# Patient Record
Sex: FEMALE | Race: WHITE
[De-identification: names, ages, dates, MRNs, and addresses within clinical notes are randomized per-mention and may not be internally consistent; named-entity substitution may affect disease eponyms.]

---

## 2023-06-16 ENCOUNTER — HOSPITAL ENCOUNTER (OUTPATIENT)
Age: 68
Discharge: HOME | End: 2023-06-16
Payer: MEDICARE

## 2023-06-16 DIAGNOSIS — K56.609: Primary | ICD-10-CM

## 2023-06-16 DIAGNOSIS — Z85.038: ICD-10-CM

## 2023-06-16 LAB
ALANINE AMINOTRANSFER ALT/SGPT: 37 U/L (ref 13–56)
ALBUMIN SERPL-MCNC: 3.9 G/DL (ref 3.2–5)
ALKALINE PHOSPHATASE: 135 U/L (ref 45–117)
ANION GAP: 6 (ref 5–15)
AST(SGOT): 37 U/L (ref 15–37)
BUN SERPL-MCNC: 14 MG/DL (ref 7–18)
BUN/CREAT RATIO: 23.4 RATIO (ref 10–20)
CALCIUM SERPL-MCNC: 9.9 MG/DL (ref 8.5–10.1)
CARBON DIOXIDE: 28 MMOL/L (ref 21–32)
CHLORIDE: 104 MMOL/L (ref 98–107)
CRP SERPL-MCNC: 19.4 MG/L (ref 0–3)
EST GLOM FILT RATE - AFR AMER: 129 ML/MIN (ref 60–?)
GLOBULIN: 3.8 G/DL (ref 2.2–4.2)
GLUCOSE: 86 MG/DL (ref 74–106)
POTASSIUM: 3.8 MMOL/L (ref 3.5–5.1)

## 2023-06-16 PROCEDURE — 83516 IMMUNOASSAY NONANTIBODY: CPT

## 2023-06-16 PROCEDURE — 86225 DNA ANTIBODY NATIVE: CPT

## 2023-06-16 PROCEDURE — 80053 COMPREHEN METABOLIC PANEL: CPT

## 2023-06-16 PROCEDURE — 85652 RBC SED RATE AUTOMATED: CPT

## 2023-06-16 PROCEDURE — 86235 NUCLEAR ANTIGEN ANTIBODY: CPT

## 2023-06-16 PROCEDURE — 36415 COLL VENOUS BLD VENIPUNCTURE: CPT

## 2023-06-16 PROCEDURE — 82941 ASSAY OF GASTRIN: CPT

## 2023-06-16 PROCEDURE — 86334 IMMUNOFIX E-PHORESIS SERUM: CPT

## 2023-06-16 PROCEDURE — 82784 ASSAY IGA/IGD/IGG/IGM EACH: CPT

## 2023-06-16 PROCEDURE — 82378 CARCINOEMBRYONIC ANTIGEN: CPT

## 2023-06-16 PROCEDURE — 86255 FLUORESCENT ANTIBODY SCREEN: CPT

## 2023-06-16 PROCEDURE — 82785 ASSAY OF IGE: CPT

## 2023-06-16 PROCEDURE — 84165 PROTEIN E-PHORESIS SERUM: CPT

## 2023-06-16 PROCEDURE — 86140 C-REACTIVE PROTEIN: CPT

## 2023-06-16 PROCEDURE — 86256 FLUORESCENT ANTIBODY TITER: CPT

## 2023-06-19 LAB
ANTI-CHROMATIN: <0.2 AI (ref 0–0.9)
ANTI-DSDNA  AB: <1 IU/ML (ref 0–9)
ANTI-JO: <0.2 AI (ref 0–0.9)
DSDNA AB SER-ACNC: <1 IU/ML (ref 0–9)
ENA JO1 AB SER-ACNC: <0.2 AI (ref 0–0.9)
IGA SER-ACNC: 156 MG/DL (ref 87–352)
IMMUNOGLOBULIN A: 156 MG/DL (ref 87–352)
SJOGREN'S ANTI-SS-A TEST: < 0.2 AI (ref 0–0.9)
SJOGREN'S ANTI-SS-B TEST: < 0.2 AI (ref 0–0.9)

## 2023-06-22 LAB
ALBUMIN SERPL-SCNC: 3.8 G/DL (ref 2.9–4.4)
ALBUMIN: 3.8 G/DL (ref 2.9–4.4)
C-ANCA SER-ACNC: (no result) TITER
CEA SERPL-MCNC: 3.6 NG/ML (ref 0–4.7)
GAMMA GLOB SERPL ELPH-MCNC: 0.9 G/DL (ref 0.4–1.8)
GAMMA GLOBULIN: 0.9 G/DL (ref 0.4–1.8)
GASTRIN SERPL-MCNC: 17 PG/ML (ref 0–115)
IGA SERPL-MCNC: 155 MG/DL (ref 87–352)
IGG SERPL-MCNC: 791 MG/DL (ref 586–1602)
IGM SERPL-MCNC: 152 MG/DL (ref 26–217)
IMMUNOGLOBULIN A: 155 MG/DL (ref 87–352)
IMMUNOGLOBULIN G: 791 MG/DL (ref 586–1602)
IMMUNOGLOBULIN M: 152 MG/DL (ref 26–217)
P-ANCA TITR SER IF: (no result) TITER
PROEL- TOTAL PROTEIN: 6.9 G/DL (ref 6–8.5)
PROT SERPL-MCNC: 6.9 G/DL (ref 6–8.5)

## 2023-07-18 ENCOUNTER — HOSPITAL ENCOUNTER (OUTPATIENT)
Dept: HOSPITAL 100 - MRI | Age: 68
Discharge: HOME | End: 2023-07-18
Payer: MEDICARE

## 2023-07-18 VITALS
OXYGEN SATURATION: 98 % | HEART RATE: 52 BPM | DIASTOLIC BLOOD PRESSURE: 73 MMHG | RESPIRATION RATE: 16 BRPM | SYSTOLIC BLOOD PRESSURE: 133 MMHG

## 2023-07-18 VITALS
HEART RATE: 49 BPM | SYSTOLIC BLOOD PRESSURE: 144 MMHG | DIASTOLIC BLOOD PRESSURE: 61 MMHG | OXYGEN SATURATION: 98 % | RESPIRATION RATE: 16 BRPM

## 2023-07-18 DIAGNOSIS — Z85.038: ICD-10-CM

## 2023-07-18 DIAGNOSIS — K56.609: Primary | ICD-10-CM

## 2023-07-18 PROCEDURE — 74183 MRI ABD W/O CNTR FLWD CNTR: CPT

## 2023-07-18 PROCEDURE — A9575 INJ GADOTERATE MEGLUMI 0.1ML: HCPCS

## 2023-07-18 PROCEDURE — 96374 THER/PROPH/DIAG INJ IV PUSH: CPT

## 2023-07-18 RX ADMIN — GLUCAGON 1 MG: KIT at 14:00

## 2023-12-21 ENCOUNTER — HOSPITAL ENCOUNTER (OUTPATIENT)
Dept: HOSPITAL 100 - RAD | Age: 68
Discharge: HOME | End: 2023-12-21
Payer: MEDICARE

## 2023-12-21 DIAGNOSIS — K56.609: Primary | ICD-10-CM

## 2023-12-21 PROCEDURE — 74018 RADEX ABDOMEN 1 VIEW: CPT

## 2024-02-07 ENCOUNTER — HOSPITAL ENCOUNTER (OUTPATIENT)
Age: 69
Discharge: HOME | End: 2024-02-07
Payer: MEDICARE

## 2024-02-07 DIAGNOSIS — K52.9: Primary | ICD-10-CM

## 2024-02-07 PROCEDURE — 83993 ASSAY FOR CALPROTECTIN FECAL: CPT

## 2024-02-07 PROCEDURE — 82274 ASSAY TEST FOR BLOOD FECAL: CPT

## 2024-02-07 PROCEDURE — 83986 ASSAY PH BODY FLUID NOS: CPT

## 2024-02-07 PROCEDURE — 87493 C DIFF AMPLIFIED PROBE: CPT

## 2024-02-07 PROCEDURE — 87177 OVA AND PARASITES SMEARS: CPT

## 2024-02-07 PROCEDURE — 87209 SMEAR COMPLEX STAIN: CPT

## 2024-02-07 PROCEDURE — 83630 LACTOFERRIN FECAL (QUAL): CPT

## 2024-02-07 PROCEDURE — 87506 IADNA-DNA/RNA PROBE TQ 6-11: CPT

## 2024-02-07 PROCEDURE — 82653 EL-1 FECAL QUANTITATIVE: CPT

## 2024-02-07 PROCEDURE — 82705 FATS/LIPIDS FECES QUAL: CPT

## 2024-02-07 PROCEDURE — 87329 GIARDIA AG IA: CPT

## 2024-02-07 NOTE — XMS RPT_ITS
Comprehensive CCD (C-CDA v2.1)  
  
                          Created on: 2024  
  
  
AR PEACOCK  
External Reference #: [id number]  
: 1955  
Sex: Female  
  
Demographics  
  
  
                                        Address             [address]  
[place]  [address]  
   
                                        Home Phone          [phone]  
   
                                        Work Phone          [phone]  
   
                                        Preferred Language  en  
   
                                        Marital Status        
   
                                        Tenriism Affiliation Unknown  
   
                                        Race                White  
   
                                        Ethnic Group        Not  or Lati  
no  
  
  
Author  
  
  
                                        Name                Unknown  
   
                                        Address             [address]  
[address]  
[place]  [address]  
   
                                        Phone               [phone]  
   
                                        Organization        [facility]  
  
  
Care Team Providers  
  
  
                                Care Team Member Name Role            Phone  
   
                                KATY ORNELAS, DR RIOS MORAN Primary Care Physician [phone]  
   
                                Consuelo PT, Anamika Unavailable     Unavailable  
   
                                LAKESHA CAZARES, PRISCILA AGUILAR Admitting       Unavai  
tu RODRIGUEZ MD FACP, CHASITY MCCRACKEN Attending       Unavail  
klever RODRIGUEZ MD FACP, CHASITY MCCRACKEN Consulting      Unavail  
able  
   
                                DR RIOS BURNS DO Primary Care    Unavailabl  
e  
  
  
  
Allergies  
  
  
                                                    Allergy   
Classification                          Reported   
Allergen(s)               Allergy Type              Date of   
Onset                     Reaction(s)               Facility  
   
                                                      
(1 source)                              Erythromycin;   
Translations:   
[erythromycin]      Drug Allergy                            Nausea and   
vomiting                                [facility]  
Work Phone:   
[phone]  
  
  
  
Medications  
Current Medications  
  
  
  
                      Medication Drug Class(es) Dates      Sig (Normalized) Sig   
(Original)  
   
                                                    biotin 1 mg oral   
tablet  
(1 source)                                          Start:   
2014                                          biotin 1000 mcg   
oral tablet Dose :   
1,000 mcg = 1   
tab(s), Oral,   
qDay, 0 Refill(s)   
Start Date:   
14 Status:   
Ordered  
   
                                                    escitalopram 10 mg   
oral tablet  
(1 source)                              Serotonin Reuptake   
Inhibitor                               Start:   
2021                              take 1 tablet by   
mouth once daily                        escitalopram 10 mg   
oral tablet See   
Instructions, TAKE   
1 TABLET BY MOUTH   
EVERY DAY, # 90   
tab(s), 3   
Refill(s),   
Pharmacy:   
[facility]/pharmacy   
#[id number], 167.6, cm,   
21 9:20:00   
EDT, Height, kg,   
21 9:20:00   
EDT, Dosing Weight   
Start Date:   
11/3/21 Status:   
Ordered  
   
                                                    Multivitamin   
preparation  
(1 source)                                          Start:   
2014                              take 1 tablet by   
mouth once daily                        Multivitamin Dose   
= 1 tab(s), Oral,   
qDay, 0 Refill(s)   
Start Date:   
14 Status:   
Ordered  
   
                                                    naproxen 250 mg   
oral tablet  
(1 source)                              Nonsteroidal   
Anti-inflammatory   
Drug                                    Start:   
2021                                          naproxen 250 mg   
oral tablet Dose :   
250 mg = 1 tab(s),   
Oral, BID, PRN as   
needed for pain, #   
20 tab(s), 0   
Refill(s) Start   
Date: 21   
Status: Ordered  
   
                                                    SUMAtriptan 50 mg   
oral tablet  
(1 source)                              Serotonin-1b and   
Serotonin-1d   
Receptor Agonist                        Start:   
2021                              take 1 tablet by   
mouth every two   
hours, then take 4   
tablets by mouth   
every twenty-four   
hours                                   SUMAtriptan 50 mg   
oral tablet See   
Instructions, TAKE   
1 TAB AT ONSET OF   
HEADACHE MAY REPAT   
IN 2 HRS MAX 4   
TABS IN 24 HRS, #   
9 tab(s), 5   
Refill(s),   
Pharmacy:   
[facility]/pharmacy   
#[id number], 168, cm,   
21 14:18:00   
EST, Height, kg,   
21 14:18:00   
EST, Dosing Weight   
Start Date:   
12/3/21 Status:   
Ordered  
  
  
  
Completed/Discontinued Medications  
  
  
  
                      Medication Drug Class(es) Dates      Sig (Normalized) Sig   
(Original)  
   
                                                    acetaminophen 325 mg /   
oxyCODONE hydrochloride   
5 mg oral tablet  
(3 sources)               Opioid Agonist            Start:   
2021  
End:   
2021                              take 1 tablet by   
mouth three times   
daily as needed   
for pain                                acetaminophen-oxyC  
ODONE 325 mg-5 mg   
oral tablet Dose =   
1 tab(s), Oral,   
TID, PRN for pain,   
fill 22, # 84   
tab(s), 0   
Refill(s),   
Pharmacy:   
[facility]/pharmacy   
#[id number],   
Fibromyalgia,   
secondary, 168,   
cm, 21   
14:18:00 EST,   
Height, 58.1, kg,   
21 14:18:00   
EST, Dosing Weight   
Start Date:   
12/3/21 Stop Date:   
21 Status:   
Ordered  
   
                                                    Amphetamine /   
Dextroamphetamine  
(3 sources)                             Central Nervous   
System   
Stimulant                               Start:   
2021  
End:   
2021                                          amphetamine-dextro  
amphetamine 15 mg   
oral capsule,   
extended release   
Dose : 15 mg = 1   
cap(s), Oral, qAM,   
fill 22, # 28   
cap(s), 0   
Refill(s),   
Pharmacy:   
[facility]/pharmacy   
#[id number], ADHD,   
predominantly   
inattentive type,   
168, cm, 21   
14:18:00 EST,   
Height, 58.1, kg,   
21 14:18:00   
EST, Dosing Weight   
Start Date:   
12/3/21 Stop Date:   
21 Status:   
Ordered  
  
  
  
                                          
   
                                          
   
                                          
   
                                          
   
                                          
   
                                          
  
  
  
Problems  
  
  
                      Problem Classification Problem    Date       Documented Da  
te Episodic/Chronic  
   
                                                    Abdominal pain  
(2 sources)                             Abdominal pain;   
Translations:   
[Periumbilical pain]                     11-          Episodic  
   
                                                    Anxiety disorders  
(1 source)                              Generalized anxiety   
disorder                                2019          Chronic  
   
                                                    Attention-deficit,   
conduct, and disruptive   
behavior disorders  
(1 source)                              Attention deficit   
hyperactivity disorder,   
predominantly   
inattentive type                        2019          Chronic  
   
                                                    Cancer of colon  
(1 source)                              History of malignant   
neoplasm of colon                       11-          Episodic  
   
                                                    Headache; including   
migraine  
(1 source)      Migraine without aura                 2021      Chronic  
   
                                                    Intestinal obstruction   
without hernia  
(1 source)      Small bowel obstruction                 2019      Episodic  
  
  
  
                                          
   
                                          
   
                                          
  
  
  
Results  
  
  
                      Test Name  Value      Interpretation Reference Range Facil  
ity  
  
  
  
                                          
   
                                          
   
                                          
   
                                          
   
                                          
   
                                          
   
                                          
   
                                          
   
                                          
   
                                          
   
                                          
   
                                          
   
                                          
   
                                          
   
                                          
   
                                          
   
                                          
   
                                          
   
                                          
   
                                          
   
                                          
   
                                          
   
                                          
   
                                          
   
                                          
   
                                          
   
                                          
   
                                          
   
                                          
   
                                          
   
                                          
   
                                          
   
                                          
   
                                          
   
                                          
   
                                          
   
                                          
   
                                          
   
                                          
   
                                          
   
                                          
   
                                          
   
                                          
   
                                          
   
                                          
   
                                          
   
                                          
   
                                          
   
                                          
   
                                          
   
                                          
   
                                          
   
                                          
   
                                          
   
                                          
   
                                          
   
                                          
   
                                          
   
                                          
   
                                          
   
                                          
   
                                          
   
                                          
   
                                          
   
                                          
   
                                          
   
                                          
   
                                          
   
                                          
   
                                          
   
                                          
   
                                          
   
                                          
   
                                          
   
                                          
   
                                          
   
                                          
   
                                          
   
                                          
   
                                          
   
                                          
   
                                          
   
                                          
   
                                          
   
                                          
   
                                          
   
                                          
   
                                          
   
                                          
   
                                          
   
                                          
   
                                          
   
                                          
   
                                          
   
                                          
   
                                          
   
                                          
   
                                          
   
                                          
   
                                          
   
                                          
   
                                          
   
                                          
   
                                          
   
                                          
   
                                          
   
                                          
   
                                          
   
                                          
   
                                          
   
                                          
   
                                          
   
                                          
   
                                          
   
                                          
   
                                          
   
                                          
   
                                          
   
                                          
   
                                          
   
                                          
   
                                          
   
                                          
   
                                          
   
                                          
   
                                          
   
                                          
   
                                          
   
                                          
   
                                          
   
                                          
   
                                          
   
                                          
   
                                          
   
                                          
   
                                          
   
                                          
   
                                          
   
                                          
   
                                          
   
                                          
   
                                          
   
                                          
   
                                          
   
                                          
   
                                          
   
                                          
   
                                          
   
                                          
   
                                          
   
                                          
   
                                          
   
                                          
   
                                          
   
                                          
   
                                          
   
                                          
   
                                          
   
                                          
   
                                          
   
                                          
   
                                          
   
                                          
   
                                          
   
                                          
   
                                          
   
                                          
   
                                          
   
                                          
   
                                          
   
                                          
   
                                          
   
                                          
   
                                          
   
                                          
   
                                          
   
                                          
   
                                          
   
                                          
   
                                          
   
                                          
   
                                          
   
                                          
   
                                          
   
                                          
   
                                          
   
                                          
   
                                          
   
                                          
   
                                          
   
                                          
   
                                          
   
                                          
   
                                          
   
                                          
   
                                          
   
                                          
   
                                          
   
                                          
   
                                          
   
                                          
   
                                          
   
                                          
   
                                          
   
                                          
   
                                          
   
                                          
   
                                          
   
                                          
   
                                          
   
                                          
   
                                          
   
                                          
   
                                          
   
                                          
   
                                          
   
                                          
   
                                          
   
                                          
   
                                          
   
                                          
   
                                          
   
                                          
   
                                          
   
                                          
   
                                          
   
                                          
   
                                          
   
                                          
   
                                          
   
                                          
   
                                          
   
                                          
   
                                          
   
                                          
   
                                          
   
                                          
   
                                          
   
                                          
   
                                          
   
                                          
   
                                          
   
                                          
   
                                          
   
                                          
   
                                          
   
                                          
   
                                          
   
                                          
   
                                          
   
                                          
   
                                          
   
                                          
   
                                          
   
                                          
   
                                          
   
                                          
   
                                          
   
                                          
   
                                          
  
  
  
Vital Signs  
  
  
                      Date Time  Vital Sign Value      Performing Clinician Faci  
lity  
   
                                                    2022   
06:      Body temperature 96.98 [degF]    SANTA LEVINE MD  [facility]  
Work Phone:   
[phone]  
   
                                                    2022   
06:                              Diastolic blood   
pressure            87 mm[Hg]           SANTA LEVINE MD      [facility]  
Work Phone:   
[phone]  
   
                                                    2022   
06:      Heart rate      53 /min         SANTA LEVINE MD  [facility]  
Work Phone:   
[phone]  
   
                                                    2022   
06:      Mean blood pressure 120 mm[Hg]      SANTA LEVINE MD  [facility]  
Work Phone:   
[phone]  
   
                                                    2022   
06:      Respiratory rate 18 /min         SANTA LEVINE MD  [facility]  
Work Phone:   
[phone]  
   
                                                    2022   
06:                              Systolic blood   
pressure            185 mm[Hg]          SANTA LEVINE MD      [facility]  
Work Phone:   
[phone]  
  
  
  
Encounters  
  
  
                          Encounter Date Encounter Type Care Provider Facility  
   
                                                    Start: 2023  
End: 2023                         Evaluation and   
management of inpatient                 PRISCILA CROWDER   
APRN-CNP                                Facility:[facility]  
   
                                                    Start: 2022  
End: 2022                         Emergency department   
patient visit             SANTA LEVINE MD          [facility]  
Work Phone:   
[phone]  
  
  
  
Procedures  
  
  
                          Date         Procedure    Procedure Detail Performing   
Clinician  
   
                          Start: 1981 Oophorectomy              SANTA DAY MD  
  
  
  
                                          
   
                                          
   
                                          
   
                                          
   
                                          
  
  
  
Immunizations  
  
  
                      Immunization Date Immunization Notes      Care Provider Fa  
cility  
   
                                        2022          SARS-CoV-2 mRNA   
(tozinameran) vaccine                     SANTA LEVINE MD      [facility]  
Work Phone:   
[phone]  
   
                                        10-          influenza, injectabl  
e,   
quadrivalent,   
preservative free;   
Translations: [Fluarix PF   
Quadrivalent [id number]]                     SANTA LEVINE MD      [facility]  
Work Phone:   
[phone]  
   
                                        2021          SARS-CoV-2 mRNA   
(tozinameran) vaccine                     SANTA LEVINE MD      [facility] [facility]  
Work Phone:   
[phone]  
   
                                        2021          SARS-CoV-2 mRNA   
(tozinameran) vaccine                     SANTA LEVINE MD      [facility]  
Work Phone:   
[phone]  
   
                                        10-          influenza virus vacc  
ine,   
unspecified formulation                     SANTA LEVINE MD      [facility]  
Work Phone:   
[phone]  
   
                                        2017          influenza virus vacc  
ine,   
unspecified formulation                     SANTA LEVINE MD      [facility]  
Work Phone:   
[phone]  
   
                                        2017          tetanus toxoid, redu  
man   
diphtheria toxoid, and   
acellular pertussis   
vaccine, adsorbed                       SANTA LEVINE MD      [facility]  
Work Phone:   
[phone]  
   
                                        2016          influenza virus vacc  
ine,   
unspecified formulation                     SANTA LEVINE MD      [facility]  
Work Phone:   
[phone]  
   
                                        2015          influenza virus vacc  
ine,   
unspecified formulation                     SANTA LEVINE MD      [facility]  
Work Phone:   
[phone]  
   
                                        2014          influenza virus vacc  
ine,   
unspecified formulation                     SANTA LEVINE MD      [facility]  
Work Phone:   
[phone]  
  
  
  
Payers  
  
  
                          Date         Payer Category Payer        Policy ID  
   
                          2023   Medicare                  [id number]  
   
                          2023   Private Health Insurance              [facility]  
[id number]  
   
                          1955   Unknown                   [id number]  
[id number]  
  
  
  
Social History  
  
  
                          Date         Type         Detail       Facility  
   
                          Start: 2019              Ex-smoker (finding) [facility]  
Work Phone: [phone]  
  
  
  
                                          
   
                                          
  
  
  
Hospital Discharge instructions 2022  
  
  
                                Note Date & Type Note            Facility  
   
                                                    2022 Hospital Discharg  
e   
instructions                              
  
  
Patient Education  
  
  
2022 06:41:46  
  
  
Small Bowel Obstruction  
  
  
Small Bowel Obstruction  
  
Small bowel obstruction can   
lead to tissue damage and even   
tissue death.  
A small bowel obstruction   
occurs when part or all of the   
small intestine (bowel) is   
blocked. As a result,   
digestive contents can t move   
through the bowel properly and   
out of the body. Treatment is   
needed right away to remove   
the blockage. This can ease   
painful symptoms. It can also   
prevent serious problems, such   
as tissue death or bursting   
(rupture) of the small bowel.   
Without treatment, a small   
bowel obstruction can be   
fatal.  
Causes of small bowel   
obstruction  
A small bowel obstruction can   
be caused by:  
Scar tissue (adhesions). These   
may form after belly   
(abdominal) surgery or an   
infection.  
Hernia. A hernia is when an   
organ pushes through a weak   
spot or tear in the abdomen   
wall. Part of the small bowel   
can push out and be seen as a   
bulge under the belly. Hernias   
can also occur internally.  
Certain health problems. These   
include when part of the bowel   
slides inside another part   
(intussusception). Other   
causes include irritable bowel   
disease such as Crohn s   
disease, and inflammation and   
sores in the intestine   
(ulcerative colitis).  
Abnormal tissue growths   
(tumors). These can form on   
the inside or outside of the   
small bowel. They are usually   
due to cancer.  
Symptoms of small bowel   
obstruction  
Common symptoms include:  
Belly cramping and pain  
Belly swelling and bloating  
Upset stomach (nausea) and   
vomiting  
Can't pass gas  
Can't pass stool   
(constipation)  
Diarrhea  
Diagnosing small bowel   
obstruction  
Your provider will ask about   
your symptoms and health   
history. You ll also have a   
physical exam. Tests may also   
be done to confirm the   
problem. These can include:  
Imaging tests. These provide   
pictures of the small bowel.   
Common tests include X-rays   
and a CT scan.  
Blood tests. These check for   
infection and other problems,   
such as excess fluid loss   
(dehydration).  
Upper GI (gastrointestinal)   
series with a small bowel   
follow-through. This test   
takes X-rays of the upper   
digestive tract from the mouth   
through the small bowel. An   
X-ray dye (contrast fluid) is   
used. The dye coats the inside   
of your upper digestive tract   
so it will show up clearly on   
X-rays.  
Treating small bowel   
obstruction  
Treatment takes place in a   
hospital. As part of your   
care, the following may be   
done:  
No food or drink is given by   
mouth. This allows your bowels   
to rest.  
An IV (intravenous) line is   
placed in a vein in your arm   
or hand. The IV line is used   
to give fluids. It may also be   
used to give medicines. These   
may be needed to ease pain,   
nausea, and other symptoms.   
They may also be needed to   
treat or prevent infections.  
A soft, thin, flexible tube   
(nasogastric tube) is inserted   
through your nose and into   
your stomach. The tube is used   
to remove extra gas and fluid   
in your stomach and bowels.   
This helps to ease symptoms   
such as pain and swelling.  
In severe cases, surgery is   
done. This may be needed if   
the small bowel is almost or   
totally blocked, or there is a   
hole in the bowel (bowel   
perforation). During surgery,   
the blockage is removed. Parts   
of the bowel may also be   
removed if there is tissue   
death. Other repair may be   
done as well, depending on   
what caused the blockage. Your   
healthcare provider will give   
you more information about   
surgery, if needed.  
You ll be watched closely in   
the hospital until your   
symptoms improve. Your   
provider will tell you when   
you can go home.  
Long-term concerns  
After treatment, most people   
recover with no lasting   
effects. If a long part of the   
bowel is removed, there is a   
greater chance for lifelong   
digestive problems. Bowel   
movements may become   
irregular. Work with your   
provider to learn the best   
ways to manage any symptoms   
you may have, and to protect   
your health.  
When to call your healthcare   
provider  
Call your provider right away   
if you have any of the   
following:  
Severe pain (call 911)  
Belly swelling or cramping   
that won t go away  
Can t pass stool or gas  
Nausea or vomiting (especially   
if the vomit looks or smells   
like stool)  
  
[id number] [employer]. [address] [place] [address].   
All rights reserved. This   
information is not intended as   
a substitute for professional   
medical care. Always follow   
your healthcare professional's   
instructions.  
  
  
  
Follow Up Care  
  
  
2022 05:59:07  
  
  
With:RIOS BURNS DO  
Address:  
[phone]  
  
When:2-4 days                           [facility]  
Work Phone: [phone]  
  
  
  
Evaluation + Plan note  
                               LaboratoryRadiology  
  
                                Note Date & Type Note            Facility  
   
                                        Evaluation + Plan note   
  
  
Future Appointments  
  
  
Appointment Date:03/10/2022   
01:45:00 PM  
Scheduled Provider:RIOS BURNS DO  
Location:[facility]  
Appointment Type: OV Controlled   
Medication  
  
  
  
Future Scheduled TestsComplete   
Metabolic Panel 10/7/21MA Mammo   
Screening Bilateral w/ Tommie   
21  
  
                                        [facility]  
Work Phone: [phone]  
  
  
  
Hospital course Narrative  
  
  
                                Note Date & Type Note            Facility  
   
                                        Hospital course Narrative   
  
  
No data available for this   
section                                 [facility]  
Work Phone: [phone]  
  
  
  
Summary Purpose  
  
  
                                                      
  
  
  
Family History  
No Family History Records FoundNo Family History Records FoundNo Family History 
Records Found  
  
Advance Directives  
No Advanced Directives Records FoundNo Advanced Directives Records FoundNo 
Advanced Directives Records Found  
  
Additional Source Comments  
  
  
  
                                                    INFORMATION SOURCE (unrecogn  
ized section and content)  
   
                                          
  
  
  
                                          
   
                                          
  
  
  
                                DATE CREATED    AUTHOR          AUTHOR'S ORGANIZ  
ATION  
   
                                2019                      [facility]  
  
  
  
                                DATE CREATED    AUTHOR          AUTHOR'S ORGANIZ  
ATION  
   
                                06/10/2023                      [facility]  
[place] ([place]  
  
  
FOR RECORDS PERTAINING TO PATIENTS WHO ARE OR HAVE BEEN ENROLLED IN A CHEMICAL 
DEPENDENCY/SUBSTANCEABUSE PROGRAM, SOME INFORMATION MAY BE OMITTED. This 
clinical summary was aggregated from multiple sources. Caution should be 
exercised in using it in the provision of clinical care. This summary normalizes
information from multiple sources, and as a consequence, information in this 
document may materially change the coding, format and clinical context of 
patient data. In addition, data may be omitted in some cases. CLINICAL DECISIONS
SHOULD BE BASED ON THE PRIMARY CLINICAL RECORDS. [facility] [employer] [facility]. provides 
no warranty or guarantee of the accuracy or completeness of information in this 
document.

## 2024-02-09 ENCOUNTER — HOSPITAL ENCOUNTER (OUTPATIENT)
Age: 69
Discharge: HOME | End: 2024-02-09
Payer: MEDICARE

## 2024-02-09 DIAGNOSIS — K58.9: Primary | ICD-10-CM

## 2024-02-09 PROCEDURE — 82705 FATS/LIPIDS FECES QUAL: CPT

## 2024-02-09 PROCEDURE — 83993 ASSAY FOR CALPROTECTIN FECAL: CPT

## 2024-02-09 PROCEDURE — 82653 EL-1 FECAL QUANTITATIVE: CPT

## 2024-02-13 LAB — PANCREATIC ELASTASE, FECAL: 50 (ref 200–?)

## 2024-02-16 LAB — CALPROTECTIN, STOOL: 26 UG/G (ref 0–120)

## 2024-02-17 LAB — PANCREATIC ELASTASE, FECAL: < 50 (ref 200–?)

## 2024-02-21 ENCOUNTER — HOSPITAL ENCOUNTER (OUTPATIENT)
Dept: HOSPITAL 100 - EN | Age: 69
Discharge: HOME | End: 2024-02-21
Payer: MEDICARE

## 2024-02-21 VITALS
DIASTOLIC BLOOD PRESSURE: 46 MMHG | OXYGEN SATURATION: 100 % | SYSTOLIC BLOOD PRESSURE: 92 MMHG | RESPIRATION RATE: 16 BRPM | HEART RATE: 66 BPM

## 2024-02-21 VITALS
DIASTOLIC BLOOD PRESSURE: 39 MMHG | HEART RATE: 67 BPM | SYSTOLIC BLOOD PRESSURE: 127 MMHG | TEMPERATURE: 98.06 F | OXYGEN SATURATION: 100 % | RESPIRATION RATE: 16 BRPM

## 2024-02-21 VITALS — DIASTOLIC BLOOD PRESSURE: 63 MMHG | SYSTOLIC BLOOD PRESSURE: 127 MMHG

## 2024-02-21 VITALS
OXYGEN SATURATION: 98 % | SYSTOLIC BLOOD PRESSURE: 127 MMHG | HEART RATE: 60 BPM | TEMPERATURE: 97.3 F | RESPIRATION RATE: 18 BRPM | DIASTOLIC BLOOD PRESSURE: 63 MMHG

## 2024-02-21 VITALS
DIASTOLIC BLOOD PRESSURE: 43 MMHG | SYSTOLIC BLOOD PRESSURE: 95 MMHG | TEMPERATURE: 97.3 F | RESPIRATION RATE: 16 BRPM | HEART RATE: 66 BPM | OXYGEN SATURATION: 100 %

## 2024-02-21 VITALS — BODY MASS INDEX: 19 KG/M2

## 2024-02-21 DIAGNOSIS — K44.9: ICD-10-CM

## 2024-02-21 DIAGNOSIS — Z85.038: ICD-10-CM

## 2024-02-21 DIAGNOSIS — K63.5: Primary | ICD-10-CM

## 2024-02-21 DIAGNOSIS — K56.609: ICD-10-CM

## 2024-02-21 DIAGNOSIS — Z98.0: ICD-10-CM

## 2024-02-21 DIAGNOSIS — K21.9: ICD-10-CM

## 2024-02-21 DIAGNOSIS — K29.80: ICD-10-CM

## 2024-02-21 PROCEDURE — 45385 COLONOSCOPY W/LESION REMOVAL: CPT

## 2024-02-21 PROCEDURE — 0DJD8ZZ INSPECTION OF LOWER INTESTINAL TRACT, VIA NATURAL OR ARTIFICIAL OPENING ENDOSCOPIC: ICD-10-PCS | Performed by: INTERNAL MEDICINE

## 2024-02-21 PROCEDURE — 43239 EGD BIOPSY SINGLE/MULTIPLE: CPT

## 2024-02-21 PROCEDURE — 88305 TISSUE EXAM BY PATHOLOGIST: CPT

## 2024-02-21 PROCEDURE — 45380 COLONOSCOPY AND BIOPSY: CPT

## 2024-02-21 NOTE — COLBX_PTH
PATHOLOGY RESULTS

PATIENT: AR PEACOCK               ACCT #:[id number]  LOC: EN         U#:[id number]
                                       AGE/SX: 68/F         ROOM:           RE2024
REG DR: Dr. Peña Acharya DO         : 1955      BED:            DIS: 2024

--------------------------------------------------------------------------------------------

SPEC #:          RECD: 24 11:05    STATUS:  ISH MONTOYA #: [id number]
                        AIDE: 24 08:15    SUBM DR: Peña Acharya

DEPT: SURGICAL PATHOLOGY                        RECD BY: Kianna Butler

ENTERED: 24 11:07 SP TYPE: COLON BX   OTHR DR: Dr. Melchor Vargas DO

Tissues:    Duodenum, NOS
            Transverse colon
            COLON BIOPSY
Procedures: Surgery Specimen Level IV

--------------------------------------------------------------------------------------------

HEADER

OPERATION:  Colonoscopy with polypectomy, EGD with biopsy  
PRE-OP DIAGNOSIS:  Intestinal obstruction, history of malignant neoplasm of colon  
TISSUE SUBMITTED:  A - Duodenum biopsy, B - Transverse colon polyp, C - Anastomosis biopsy  

--------------------------------------------------------------------------------------------

MICROSCOPIC DIAGNOSIS

A.  Duodenum, biopsy:  
    No pathologic change.  
B.  Transverse colon polyp, biopsy:  
    Fragments of hyperplastic polyp.  
C.  Anastomosis biopsy:  
    Hyperplastic polyp.  
  
AM:naldo  2024   

--------------------------------------------------------------------------------------------

MICROSCOPIC DESCRIPTION

Slides are reviewed.  

--------------------------------------------------------------------------------------------

GROSS DESCRIPTION

A - Received in fixative is one container labeled with the patient's name and designated 
 duodenum biopsy.   The specimen consists of multiple irregular fragments of light tan soft 
tissue that in aggregate measure 1.0 x 0.3 x 0.1 cm.  The specimen is totally submitted in 
one cassette.   
B - Received in fixative is one container labeled with the patient's name and designated 
 transverse colon polyp.   The specimen consists of multiple irregular fragments of light 
tan soft tissue that in aggregate measure 0.6 x 0.3 x 0.1 cm.  The specimen is totally 
submitted in one cassette.   
C - Received in fixative is one container labeled with the patient's name and designated 
 anastomosis biopsy.   The specimen consists of one irregular fragment of light tan soft 
tissue that measures 0.3 x 0.3 x 0.1 cm.  The specimen is totally submitted in one cassette. 
 / NERY:naldo  2024  TC:5  
CPT: 88305 x3

## 2024-02-21 NOTE — OP.CCLET_ITS
2/21/2024 
Melchor Vargas 
Re : Colonoscopy procedure for Arpita Mcghee 
Marlynr  Sam 
This procedure was performed on Wednesday, February 21, 2024.  My impressions 
 and recommendations are as follows: 
Impressions :  
- Patent end-to-side colo-colonic anastomosis, characterized by healthy 
 appearing mucosa.  Biopsied.  
- One 8 mm polyp in the transverse colon, removed with a hot snare.  Resected 
 and retrieved.  
- The examination was otherwise normal. 
Recommendations :  
- Discharge patient to home.  
- Resume previous diet.  
- Continue present medications.  
- Await pathology results.  
- Repeat colonoscopy for surveillance. 
My findings are described in the full procedure note, which is enclosed.  If I 
 can be of further assistance, please feel free to contact me at . 
Sincerely, 
Peña Acharya,  
2/21/2024 8:49:59 AM 
This report has been signed electronically.

## 2024-02-21 NOTE — OP.CCLET_ITS
2/21/2024 
Melchor Vargas 
Re : Upper GI endoscopy procedure for Arpita Mcghee 
Marlynr  Sam 
This procedure was performed on Wednesday, February 21, 2024.  My impressions 
 and recommendations are as follows: 
Impressions :  
- Normal esophagus.  
- Small hiatal hernia.  
- Erythematous mucosa in the gastric body.  
- Bile duodenitis.  Biopsied. 
Recommendations :  
- Discharge patient to home.  
- Resume previous diet.  
- Continue present medications.  
- Await pathology results. 
My findings are described in the full procedure note, which is enclosed.  If I 
 can be of further assistance, please feel free to contact me at . 
Sincerely, 
Peña Acharya,  
2/21/2024 8:46:28 AM 
This report has been signed electronically.

## 2024-02-21 NOTE — XMS RPT_ITS
Comprehensive CCD (C-CDA v2.1)  
  
                          Created on: 2024  
  
  
AR PEACOCK JEFF  
External Reference #: [id number]  
: 1955  
Sex: Female  
  
Demographics  
  
  
                                        Address             [address]  
[place]  [address]  
   
                                        Home Phone          [phone]  
   
                                        Preferred Language  en  
   
                                        Marital Status        
   
                                        M[employer] Affiliation Unknown  
   
                                        Race                White  
   
                                        Ethnic Group        Not  or Lati  
no  
  
  
Author  
  
  
                                        Name                Unknown  
   
                                        Address             [address]  
[address]  
[place]  [address]  
   
                                        Phone               [phone]  
   
                                        Organization        [facility]  
  
  
Care Team Providers  
  
  
                                Care Team Member Name Role            Phone  
   
                                DR RIOS BURNS DO Primary Care Physician [phone]  
   
                                nAamika Cordero PT Unavailable     Unavailable  
   
                                DR RIOS BURNS DO Primary Care    Unavailtalia TATE DO, GERONIMO Consulting      Unavailable  
   
                                YAKOV ORNELAS, EDUARDO Attending       Unavailable  
  
  
  
Allergies  
  
  
                                                    Allergy   
Classification                          Reported   
Allergen(s)               Allergy Type              Date of   
Onset                     Reaction(s)               Facility  
   
                                                      
(1 source)                              Erythromycin;   
Translations:   
[erythromycin]      Drug Allergy                            Nausea and   
vomiting                                [facility]  
Work Phone:   
[phone]  
  
  
  
Medications  
Current Medications  
  
  
  
                      Medication Drug Class(es) Dates      Sig (Normalized) Sig   
(Original)  
   
                                                    biotin 1 mg oral   
tablet  
(1 source)                                          Start:   
2014                                          biotin 1000 mcg   
oral tablet Dose :   
1,000 mcg = 1   
tab(s), Oral,   
qDay, 0 Refill(s)   
Start Date:   
14 Status:   
Ordered  
   
                                                    escitalopram 10 mg   
oral tablet  
(1 source)                              Serotonin Reuptake   
Inhibitor                               Start:   
2021                              take 1 tablet by   
mouth once daily                        escitalopram 10 mg   
oral tablet See   
Instructions, TAKE   
1 TABLET BY MOUTH   
EVERY DAY, # 90   
tab(s), 3   
Refill(s),   
Pharmacy:   
[facility]/pharmacy   
#[id number], 167.6, cm,   
21 9:20:00   
EDT, Height, kg,   
21 9:20:00   
EDT, Dosing Weight   
Start Date:   
11/3/21 Status:   
Ordered  
   
                                                    Multivitamin   
preparation  
(1 source)                                          Start:   
2014                              take 1 tablet by   
mouth once daily                        Multivitamin Dose   
= 1 tab(s), Oral,   
qDay, 0 Refill(s)   
Start Date:   
5/29/14 Status:   
Ordered  
   
                                                    naproxen 250 mg   
oral tablet  
(1 source)                              Nonsteroidal   
Anti-inflammatory   
Drug                                    Start:   
2021                                          naproxen 250 mg   
oral tablet Dose :   
250 mg = 1 tab(s),   
Oral, BID, PRN as   
needed for pain, #   
20 tab(s), 0   
Refill(s) Start   
Date: 21   
Status: Ordered  
   
                                                    SUMAtriptan 50 mg   
oral tablet  
(1 source)                              Serotonin-1b and   
Serotonin-1d   
Receptor Agonist                        Start:   
2021                              take 1 tablet by   
mouth every two   
hours, then take 4   
tablets by mouth   
every twenty-four   
hours                                   SUMAtriptan 50 mg   
oral tablet See   
Instructions, TAKE   
1 TAB AT ONSET OF   
HEADACHE MAY REPAT   
IN 2 HRS MAX 4   
TABS IN 24 HRS, #   
9 tab(s), 5   
Refill(s),   
Pharmacy:   
[facility]/pharmacy   
#[id number], 168, cm,   
21 14:18:00   
EST, Height, kg,   
21 14:18:00   
EST, Dosing Weight   
Start Date:   
12/3/21 Status:   
Ordered  
  
  
  
Completed/Discontinued Medications  
  
  
  
                      Medication Drug Class(es) Dates      Sig (Normalized) Sig   
(Original)  
   
                                                    acetaminophen 325 mg /   
oxyCODONE hydrochloride   
5 mg oral tablet  
(3 sources)               Opioid Agonist            Start:   
2021  
End:   
2021                              take 1 tablet by   
mouth three times   
daily as needed   
for pain                                acetaminophen-oxyC  
ODONE 325 mg-5 mg   
oral tablet Dose =   
1 tab(s), Oral,   
TID, PRN for pain,   
fill 22, # 84   
tab(s), 0   
Refill(s),   
Pharmacy:   
[facility]/pharmacy   
#[id number],   
Fibromyalgia,   
secondary, 168,   
cm, 21   
14:18:00 EST,   
Height, 58.1, kg,   
21 14:18:00   
EST, Dosing Weight   
Start Date:   
12/3/21 Stop Date:   
21 Status:   
Ordered  
   
                                                    Amphetamine /   
Dextroamphetamine  
(3 sources)                             Central Nervous   
System   
Stimulant                               Start:   
2021  
End:   
2021                                          amphetamine-dextro  
amphetamine 15 mg   
oral capsule,   
extended release   
Dose : 15 mg = 1   
cap(s), Oral, qAM,   
fill 22, # 28   
cap(s), 0   
Refill(s),   
Pharmacy:   
[facility]/pharmacy   
#[id number], ADHD,   
predominantly   
inattentive type,   
168, cm, 21   
14:18:00 EST,   
Height, 58.1, kg,   
21 14:18:00   
EST, Dosing Weight   
Start Date:   
12/3/21 Stop Date:   
21 Status:   
Ordered  
  
  
  
                                          
   
                                          
   
                                          
   
                                          
   
                                          
   
                                          
  
  
  
Problems  
  
  
                      Problem Classification Problem    Date       Documented Da  
te Episodic/Chronic  
   
                                                    Abdominal pain  
(2 sources)                             Abdominal pain;   
Translations:   
[Periumbilical pain]                     11-          Episodic  
   
                                                    Anxiety disorders  
(1 source)                              Generalized anxiety   
disorder                                2019          Chronic  
   
                                                    Attention-deficit,   
conduct, and disruptive   
behavior disorders  
(1 source)                              Attention deficit   
hyperactivity disorder,   
predominantly   
inattentive type                        2019          Chronic  
   
                                                    Cancer of colon  
(1 source)                              History of malignant   
neoplasm of colon                       11-          Episodic  
   
                                                    Headache; including   
migraine  
(1 source)      Migraine without aura                 2021      Chronic  
   
                                                    Intestinal obstruction   
without hernia  
(1 source)      Small bowel obstruction                 2019      Episodic  
  
  
  
                                          
   
                                          
   
                                          
  
  
  
Results  
  
  
                      Test Name  Value      Interpretation Reference Range Facil  
ity  
  
  
  
                                          
   
                                          
   
                                          
   
                                          
   
                                          
   
                                          
   
                                          
   
                                          
   
                                          
   
                                          
   
                                          
   
                                          
   
                                          
   
                                          
   
                                          
   
                                          
   
                                          
   
                                          
   
                                          
   
                                          
   
                                          
   
                                          
   
                                          
   
                                          
   
                                          
   
                                          
   
                                          
   
                                          
   
                                          
   
                                          
   
                                          
   
                                          
   
                                          
   
                                          
   
                                          
   
                                          
   
                                          
   
                                          
   
                                          
   
                                          
   
                                          
   
                                          
   
                                          
   
                                          
   
                                          
   
                                          
   
                                          
   
                                          
   
                                          
   
                                          
   
                                          
   
                                          
   
                                          
   
                                          
   
                                          
   
                                          
   
                                          
   
                                          
   
                                          
   
                                          
   
                                          
   
                                          
   
                                          
   
                                          
   
                                          
   
                                          
   
                                          
   
                                          
   
                                          
   
                                          
   
                                          
   
                                          
   
                                          
   
                                          
   
                                          
   
                                          
   
                                          
   
                                          
   
                                          
   
                                          
   
                                          
   
                                          
   
                                          
   
                                          
   
                                          
   
                                          
   
                                          
   
                                          
   
                                          
   
                                          
   
                                          
   
                                          
   
                                          
   
                                          
   
                                          
   
                                          
   
                                          
   
                                          
   
                                          
   
                                          
   
                                          
   
                                          
   
                                          
   
                                          
   
                                          
   
                                          
   
                                          
   
                                          
   
                                          
   
                                          
   
                                          
   
                                          
   
                                          
   
                                          
   
                                          
   
                                          
   
                                          
   
                                          
   
                                          
   
                                          
   
                                          
   
                                          
   
                                          
   
                                          
   
                                          
   
                                          
   
                                          
   
                                          
   
                                          
   
                                          
   
                                          
   
                                          
   
                                          
   
                                          
   
                                          
   
                                          
   
                                          
   
                                          
   
                                          
   
                                          
   
                                          
   
                                          
   
                                          
   
                                          
   
                                          
   
                                          
  
  
  
Vital Signs  
  
  
                      Date Time  Vital Sign Value      Performing Clinician Faci  
lity  
   
                                                    2022   
06:      Body temperature 96.98 [degF]    SANTA LEVINE MD  [facility]  
Work Phone:   
[phone]  
   
                                                    2022   
06:                              Diastolic blood   
pressure            87 mm[Hg]           SANTA LEVINE MD      [facility]  
Work Phone:   
[phone]  
   
                                                    2022   
06:      Heart rate      53 /min         SANTA LEVINE MD  [facility]  
Work Phone:   
[phone]  
   
                                                    2022   
06:      Mean blood pressure 120 mm[Hg]      SANTA LEVINE MD  [facility]  
Work Phone:   
[phone]  
   
                                                    2022   
06:      Respiratory rate 18 /min         SANTA LEVINE MD  [facility]  
Work Phone:   
[phone]  
   
                                                    2022   
06:                              Systolic blood   
pressure            185 mm[Hg]          SANTA LEVINE MD      [facility]  
Work Phone:   
[phone]  
  
  
  
Encounters  
  
  
                          Encounter Date Encounter Type Care Provider Facility  
   
                                                    Start: 02-  
End: 02-                         Emergency department   
patient visit             DR RIOS BURNS DO      Facility:[facility]  
   
                                                    Start: 2022  
End: 2022                         Emergency department   
patient visit             SANTA LEVINE MD          [facility]  
Work Phone:   
[phone]  
  
  
  
Procedures  
  
  
                          Date         Procedure    Procedure Detail Performing   
Clinician  
   
                          Start: 1981 Oophorectomy              SANTA DAY MD  
  
  
  
                                          
   
                                          
   
                                          
   
                                          
   
                                          
  
  
  
Immunizations  
  
  
                      Immunization Date Immunization Notes      Care Provider Fa  
cility  
   
                                        2022          SARS-CoV-2 mRNA   
(tozinameran) vaccine                     SANTA LEVINE MD      [facility]  
Work Phone:   
[phone]  
   
                                        10-          influenza, injectabl  
e,   
quadrivalent,   
preservative free;   
Translations: [Fluarix PF   
Quadrivalent [id number]]                     SANTA LEVINE MD      [facility]  
Work Phone:   
[phone]  
   
                                        2021          SARS-CoV-2 mRNA   
(tozinameran) vaccine                     SANTA LEVINE MD      [facility]  
Work Phone:   
[phone]  
   
                                        2021          SARS-CoV-2 mRNA   
(tozinameran) vaccine                     SANTA LEVINE MD      [facility]  
Work Phone:   
[phone]  
   
                                        10-          influenza virus vacc  
ine,   
unspecified formulation                     SANTA LEVINE MD      [facility]  
Work Phone:   
[phone]  
   
                                        2017          influenza virus vacc  
ine,   
unspecified formulation                     SANTA LEVINE MD      [facility]  
Work Phone:   
[phone]  
   
                                        2017          tetanus toxoid, redu  
man   
diphtheria toxoid, and   
acellular pertussis   
vaccine, adsorbed                       SANTA LEVINE MD      [facility]  
Work Phone:   
[phone]  
   
                                        2016          influenza virus vacc  
ine,   
unspecified formulation                     SANTA LEVINE MD      [facility]  
Work Phone:   
[phone]  
   
                                        2015          influenza virus vacc  
ine,   
unspecified formulation                     SANTA LEVINE MD      [facility]  
Work Phone:   
[phone]  
   
                                        2014          influenza virus vacc  
ine,   
unspecified formulation                     SANTA LEVINE MD      [facility]  
Work Phone:   
[phone]  
  
  
  
Payers  
  
  
                          Date         Payer Category Payer        Policy ID  
   
                          02-   Medicare                  [id number]  
   
                          02-   Private Health Insurance              [employer]  
[id number]  
   
                          1955   Unknown                   [id number]  
[id number]  
  
  
  
Social History  
  
  
                          Date         Type         Detail       Facility  
   
                          Start: 2019              Ex-smoker (finding) [facility]  
Work Phone: [phone]  
  
  
  
                                          
   
                                          
  
  
  
Clinical Note 2024  
  
  
                                Note Date & Type Note            Facility  
   
                                        2024 Note     .  
MICRO - Microbiology  
PROCEDURE: Urine Culture [*1]   
ACCESSION: [id number]  
SOURCE: Urine, Clean Catch BODY SITE:  
COLLECTED DATE/TIME: 2/10/2024 10:52   
EST RECEIVED DATE/TIME: 2/10/2024   
16:38 EST  
START DATE/TIME: 2/10/2024 16:38 EST   
FREE TEXT SOURCE:  
***FINAL REPORTS***  
Final Report []  
Verified Date/Time/Personnel:   
2024 14:02 EST  
>100,000 cfu/ml Mixed growth   
consistent with normal urogenital   
mynor.  
Performing Locations  
*1: This test was performed at:  
[facility], [address] [place] [address], [address] [place] [address] ([place])  
  
  
  
Hospital Discharge instructions 2022  
  
  
                                Note Date & Type Note            Facility  
   
                                                    2022 Hospital Discharg  
e   
instructions                              
  
  
Patient Education  
  
  
2022 06:41:46  
  
  
Small Bowel Obstruction  
  
  
Small Bowel Obstruction  
  
Small bowel obstruction can   
lead to tissue damage and even   
tissue death.  
A small bowel obstruction   
occurs when part or all of the   
small intestine (bowel) is   
blocked. As a result,   
digestive contents can t move   
through the bowel properly and   
out of the body. Treatment is   
needed right away to remove   
the blockage. This can ease   
painful symptoms. It can also   
prevent serious problems, such   
as tissue death or bursting   
(rupture) of the small bowel.   
Without treatment, a small   
bowel obstruction can be   
fatal.  
Causes of small bowel   
obstruction  
A small bowel obstruction can   
be caused by:  
Scar tissue (adhesions). These   
may form after belly   
(abdominal) surgery or an   
infection.  
Hernia. A hernia is when an   
organ pushes through a weak   
spot or tear in the abdomen   
wall. Part of the small bowel   
can push out and be seen as a   
bulge under the belly. Hernias   
can also occur internally.  
Certain health problems. These   
include when part of the bowel   
slides inside another part   
(intussusception). Other   
causes include irritable bowel   
disease such as Crohn s   
disease, and inflammation and   
sores in the intestine   
(ulcerative colitis).  
Abnormal tissue growths   
(tumors). These can form on   
the inside or outside of the   
small bowel. They are usually   
due to cancer.  
Symptoms of small bowel   
obstruction  
Common symptoms include:  
Belly cramping and pain  
Belly swelling and bloating  
Upset stomach (nausea) and   
vomiting  
Can't pass gas  
Can't pass stool   
(constipation)  
Diarrhea  
Diagnosing small bowel   
obstruction  
Your provider will ask about   
your symptoms and health   
history. You ll also have a   
physical exam. Tests may also   
be done to confirm the   
problem. These can include:  
Imaging tests. These provide   
pictures of the small bowel.   
Common tests include X-rays   
and a CT scan.  
Blood tests. These check for   
infection and other problems,   
such as excess fluid loss   
(dehydration).  
Upper GI (gastrointestinal)   
series with a small bowel   
follow-through. This test   
takes X-rays of the upper   
digestive tract from the mouth   
through the small bowel. An   
X-ray dye (contrast fluid) is   
used. The dye coats the inside   
of your upper digestive tract   
so it will show up clearly on   
X-rays.  
Treating small bowel   
obstruction  
Treatment takes place in a   
hospital. As part of your   
care, the following may be   
done:  
No food or drink is given by   
mouth. This allows your bowels   
to rest.  
An IV (intravenous) line is   
placed in a vein in your arm   
or hand. The IV line is used   
to give fluids. It may also be   
used to give medicines. These   
may be needed to ease pain,   
nausea, and other symptoms.   
They may also be needed to   
treat or prevent infections.  
A soft, thin, flexible tube   
(nasogastric tube) is inserted   
through your nose and into   
your stomach. The tube is used   
to remove extra gas and fluid   
in your stomach and bowels.   
This helps to ease symptoms   
such as pain and swelling.  
In severe cases, surgery is   
done. This may be needed if   
the small bowel is almost or   
totally blocked, or there is a   
hole in the bowel (bowel   
perforation). During surgery,   
the blockage is removed. Parts   
of the bowel may also be   
removed if there is tissue   
death. Other repair may be   
done as well, depending on   
what caused the blockage. Your   
healthcare provider will give   
you more information about   
surgery, if needed.  
You ll be watched closely in   
the hospital until your   
symptoms improve. Your   
provider will tell you when   
you can go home.  
Long-term concerns  
After treatment, most people   
recover with no lasting   
effects. If a long part of the   
bowel is removed, there is a   
greater chance for lifelong   
digestive problems. Bowel   
movements may become   
irregular. Work with your   
provider to learn the best   
ways to manage any symptoms   
you may have, and to protect   
your health.  
When to call your healthcare   
provider  
Call your provider right away   
if you have any of the   
following:  
Severe pain (call 911)  
Belly swelling or cramping   
that won t go away  
Can t pass stool or gas  
Nausea or vomiting (especially   
if the vomit looks or smells   
like stool)  
  
[id number] [employer]. [address] [place] [address].   
All rights reserved. This   
information is not intended as   
a substitute for professional   
medical care. Always follow   
your healthcare professional's   
instructions.  
  
  
  
Follow Up Care  
  
  
2022 05:59:07  
  
  
With:RIOS BURNS DO  
Address:  
[phone]  
  
When:2-4 days                           [facility]  
Work Phone: [phone]  
  
  
  
Evaluation + Plan note  
                               LaboratoryRadiology  
  
                                Note Date & Type Note            Facility  
   
                                        Evaluation + Plan note   
  
  
Future Appointments  
  
  
Appointment Date:03/10/2022   
01:45:00 PM  
Scheduled Provider:RIOS BURNS DO  
Location:[facility]  
Appointment Type:PC OV Controlled   
Medication  
  
  
  
Future Scheduled TestsComplete   
Metabolic Panel 10/7/21MA Mammo   
Screening Bilateral w/ Tommie   
21  
  
                                        [facility]  
Work Phone: [phone]  
  
  
  
Hospital course Narrative  
  
  
                                Note Date & Type Note            Facility  
   
                                        Hospital course Narrative   
  
  
No data available for this   
section                                 [facility]  
Work Phone: [phone]  
  
  
  
Summary Purpose  
  
  
                                                      
  
  
  
Family History  
No Family History Records FoundNo Family History Records FoundNo Family History 
Records Found  
  
Advance Directives  
No Advanced Directives Records FoundNo Advanced Directives Records FoundNo 
Advanced Directives Records Found  
  
Additional Source Comments  
  
  
  
                                                    INFORMATION SOURCE (unrecogn  
ized section and content)  
   
                                          
  
  
  
                                          
   
                                          
  
  
  
                                DATE CREATED    AUTHOR          AUTHOR'S ORGANLUCILA  
ATION  
   
                                2019                      [facility]  
  
  
  
                                DATE CREATED    AUTHOR          AUTHOR'S ORGANLUCILA  
ATION  
   
                                02/15/2024                      [facility]  
[place] ([place])  
  
  
FOR RECORDS PERTAINING TO PATIENTS WHO ARE OR HAVE BEEN ENROLLED IN A CHEMICAL 
DEPENDENCY/SUBSTANCEABUSE PROGRAM, SOME INFORMATION MAY BE OMITTED. This 
clinical summary was aggregated from multiple sources. Caution should be 
exercised in using it in the provision of clinical care. This summary normalizes
 information from multiple sources, and as a consequence, information in this 
document may materially change the coding, format and clinical context of 
patient data. In addition, data may be omitted in some cases. CLINICAL DECISIONS
 SHOULD BE BASED ON THE PRIMARY CLINICAL RECORDS. [facility]. provides
 no warranty or guarantee of the accuracy or completeness of information in this
 document.

## 2024-02-21 NOTE — OP.EGD_ITS
Patient Name: Arpita Mcghee 
Procedure Date: 2/21/2024 8:04 AM 
MRN: [id number] 
YOB: 1955 
Account Number: [id number] 
Age: 68 
Procedure:                    Upper GI endoscopy 
Indications:                  Epigastric abdominal pain, Functional Dyspepsia 
Providers:                    Peña Acharya DO 
Referring MD:                 Melchor Vargas 
Medicines:                    Monitored Anesthesia Care 
Patient Profile:              This is a 68 year old female. Refer to note in  
                              patient chart for documentation of history and  
                              physical. Patient has symptoms of chronic  
                              abdominal cramping and chronic epigastric  
                              abdominal pain. 
Complications:                No immediate complications. 
Procedure:                    Pre-Anesthesia Assessment: 
                              - Prior to the procedure, a History and  
                              Physical was performed, and patient medications  
                              and allergies were reviewed. The patient is  
                              competent. The risks and benefits of the  
                              procedure and the sedation options and risks  
                              were discussed with the patient. All questions  
                              were answered and informed consent was  
                              obtained. Patient identification and proposed  
                              procedure were verified by the physician in the  
                              pre-procedure area. Mental Status Examination:  
                              alert and oriented. Airway Examination: normal  
                              oropharyngeal airway and neck mobility.  
                              Respiratory Examination: clear to auscultation.  
                              CV Examination: normal. Prophylactic  
                              Antibiotics: The patient does not require  
                              prophylactic antibiotics. Prior Anticoagulants:  
                              The patient has taken no anticoagulant or  
                              antiplatelet agents. ASA Grade Assessment: II -  
                              A patient with mild systemic disease. After  
                              reviewing the risks and benefits, the patient  
                              was deemed in satisfactory condition to undergo  
                              the procedure. The anesthesia plan was to use  
                              monitored anesthesia care (MAC). Immediately  
                              prior to administration of medications, the  
                              patient was re-assessed for adequacy to receive  
                              sedatives. The heart rate, respiratory rate,  
                              oxygen saturations, blood pressure, adequacy of  
                              pulmonary ventilation, and response to care  
                              were monitored throughout the procedure. The  
                              physical status of the patient was re-assessed  
                              after the procedure. 
                              After obtaining informed consent, the endoscope  
                              was passed under direct vision. Throughout the  
                              procedure, the patient's blood pressure, pulse,  
                              and oxygen saturations were monitored  
                              continuously. The Colonoscope was introduced  
                              through the mouth, and advanced to the second  
                              part of duodenum. The upper GI endoscopy was  
                              accomplished without difficulty. The patient  
                              tolerated the procedure well. 
Scope In: 8:18:45 AM 
Scope Out: 8:22:41 AM 
Total Procedure Duration Time 0 hours 3 minutes 56 seconds  
Findings: 
     The examined esophagus was normal. 
     A small hiatal hernia was present. 
     Diffuse mildly erythematous mucosa without bleeding was found in the  
     gastric body. 
     Patchy inflammation characterized by erosions, erythema and friability  
     was found in the duodenal bulb. Biopsies were taken with a cold forceps  
     for histology. Verification of patient identification for the specimen  
     was done. Estimated blood loss was minimal. 
Impression:                   - Normal esophagus. 
                              - Small hiatal hernia. 
                              - Erythematous mucosa in the gastric body. 
                              - Bile duodenitis. Biopsied. 
Recommendation:               - Discharge patient to home. 
                              - Resume previous diet. 
                              - Continue present medications. 
                              - Await pathology results. 
Procedure Code(s):            --- Professional --- 
                              57820, Esophagogastroduodenoscopy, flexible,  
                              transoral; with biopsy, single or multiple 
CPT copyright 2022 [employer]. All rights reserved. 
The codes documented in this report are preliminary and upon  review may  
be revised to meet current compliance requirements. 
Peña Acharya DO 
2/21/2024 8:46:28 AM 
This report has been signed electronically. 
Number of Addenda: 0 
Note Initiated On: 2/21/2024 8:04 AM

## 2024-02-21 NOTE — HP.PCM_ITS
History and Physical    
Date of Admission: 02/21/24    
AR PEACOCK, is a 67 F who presents to the office today for     
    
PMH ADHD; anxiety; depression; fibromyalgia.    
    
[facility] established until he retired.     
    
    
    
[facility] hospitalization 1.20.23-1.22.23 with small bowel obstruction   
versus ileus s/p bowel rest and IVF.    
CT abd/pel 1.20.23 small hiatal hernia with wall thickening which is new; small   
amount of pelvic fluid; rectal anastomosis; mildly dilated small bowel   
throughout pelvis and into distal portion of ileum; gastric distention with wall  
thickening.    
    
PCP OV 3.13.23 as routine OV for chronic health issues. GI issues include   
recurrent SBO requiring hospitalization (she estimates approximately 10 SBO);   
notes history of colon cancer s/p chemotherapy and radiation, Stage 2-3    
    
?????????????? Biochemical 1.2023 CBC, CMP without pertinent abnormality.    
    
*BGI established 6.16.23 colorectal cancer near rectum stage 2-3 with minimal   
invasion into soft tissue s/p surgical resection near rectum 2012ish with   
Ileostomy placement with reversal six weeks later. She had chemotherapy and   
radiation. Reports 14 SBO since surgery; in various areas throughout her small   
bowel, but may also be occurring near ileostomy site.    
    
?????????????? Biochemical ESR, CMP, CATHERINE comp, ANCA, CEA, celiac, gastrin, GAME,  
VINI, IBD without pertinent abnormality.    
    
?????????????? AST 37-ALT 37-AP H135, CRP H19.4    
    
?????????????? MREnterography 7.19.23 WNL    
    
Contact, VM 8.1.23 with enterography results.    
    
OV 10.30.23 doing well without SBO since LV. BM vary between loose stools and   
hard stool. Since establishing with this clinic she had decreased caliber and   
softer stools and occurs typically postprandially; because of this she will   
avoid eating if there are certain activities during her day. Tries to eat softer  
foods with smaller quantities.    
    
ROS    
Const    
Constitutional: No anorexia, fatigue, fever(s), weight change or sleep problems    
Eyes    
Eyes: No change in vision    
ENT    
ENT: No abnormal hearing, difficulty swallowing, mouth lesions, tongue swelling   
or throat swelling    
Resp    
Respiratory: No cough or shortness of breath    
Cardio    
Cardiology: No chest pain at rest, chest pain with exertion, shortness of breath  
or dyspnea on exertion    
Gastro    
GI: No difficulty swallowing    
    
Genitourinary-Female: No difficulty urinating or burning urination    
Musc    
Musculoskeletal: No joint pain, joint swelling, muscle weakness or decreased   
muscle mass    
Skin    
Skin: No hair loss in leg, yellowing of the eye, itchy eyes, rash, skin ulcer or  
skin swelling    
Neuro    
Neurology: No abnormal hearing, abnormal movements, confusion, unsteady   
gait/balance or memory loss    
Psych    
Psychiatric: No anxiety, No confusion and No memory loss    
Endo    
Endocrine: No fatigue or weight change    
Aller/Imm    
Allergy/Immunologic: No itchy eyes, throat swelling or tongue swelling    
Hema/Lymp    
Hematologic/Lymphatic: No easy bleeding, easy bruising or enlarged lymph nodes    
    
Exam    
Const    
General: cooperative and comfortable    
Nutritional Appearance: average body habitus and well nourished    
HENMT    
Head: normal to inspection    
Ears: hearing grossly normal bilaterally    
Nose: external nose normal    
Face and sinus: normal facial exam    
Mouth: oral mucosae normal    
Throat: posterior oropharynx normal    
Eyes    
General: appearance normal, both eyes and all related structures    
Neck    
Neck: normal visual inspection    
Chest    
Chest palpation & inspection: normal inspection of the chest and normal   
palpation of entire chest wall    
Resp    
Effort & Inspection: normal respiratory effort    
Auscultation: Bilateral: Clear to Auscultation    
Cardio    
Palpation: normal PMI    
Rate: regular rate    
Rhythm: regular rhythm    
GI    
Inspection: normal to inspection    
Auscultation: normal bowel sounds    
Percussion: normal to percussion    
Palpation: no hepatosplenomegaly    
Skin    
General: no rashes or lesions noted    
Neuro    
General: patient alert    
Extrem    
General: normal to inspection    
Psych    
Affect: normal affect    
    
Quality Reporting    
Tobacco Screening ()    
Smoking Status: Former smoker    
    
Assessment and Plan    
Assessment and Plan    
(1) Intestinal obstruction:     
      Status: Chronic    
      Qualifiers:    
        Intestinal obstruction type: unspecified  Intestinal obstruction extent:  
unspecified extent  Qualified Code(s): K56.609 - Unspecified intestinal   
obstruction, unspecified as to partial versus complete obstruction    
      Plan:     
Differential diagnosis for her multiple small bowel obstructions are radiation-  
induced enteritis, adhesions, intermittent volvulus or intussusception of the   
bowel.  She will undergo an MRI enterography and also biochemical testing for   
any associated vasculitis or inflammatory bowel disease. She will undergo   
patency capsule study.    
(2) History of malignant neoplasm of colon:     
      Status: Chronic    
      Plan:     
She had a surveillance colonoscopy because of her history of rectal cancer about  
3 years ago.  She will need a repeat colonoscopy in the future for surveillance   
purposes.

## 2024-02-21 NOTE — PCM.HP.BLA
History and Physical
Date of Admission: 02/21/24
AR PEACOCK, is a 67 F who presents to the office today for 

PMH ADHD; anxiety; depression; fibromyalgia.

GI [facility] established until he retired. 



[facility] hospitalization 1.20.23-1.22.23 with small bowel obstruction versus ileus s/p bowel rest and IVF.
CT abd/pel 1.20.23 small hiatal hernia with wall thickening which is new; small amount of pelvic fluid; rectal anastomosis; mildly dilated small bowel throughout pelvis and into distal portion of ileum; gastric distention with wall thickening.

PCP OV 3.13.23 as routine OV for chronic health issues. GI issues include recurrent SBO requiring hospitalization (she estimates approximately 10 SBO); notes history of colon cancer s/p chemotherapy and radiation, Stage 2-3

?????????????? Biochemical 1.2023 CBC, CMP without pertinent abnormality.

*BGI established 6.16.23 colorectal cancer near rectum stage 2-3 with minimal invasion into soft tissue s/p surgical resection near rectum 2012ish with Ileostomy placement with reversal six weeks later. She had chemotherapy and radiation. Reports 14 
SBO since surgery; in various areas throughout her small bowel, but may also be occurring near ileostomy site.

?????????????? Biochemical ESR, CMP, CATHERINE comp, ANCA, CEA, celiac, gastrin, GAME, VINI, IBD without pertinent abnormality.

?????????????? AST 37-ALT 37-AP H135, CRP H19.4

?????????????? MREnterography 7.19.23 WNL

Contact, VM 8.1.23 with enterography results.

OV 10.30.23 doing well without SBO since LV. BM vary between loose stools and hard stool. Since establishing with this clinic she had decreased caliber and softer stools and occurs typically postprandially; because of this she will avoid eating if 
there are certain activities during her day. Tries to eat softer foods with smaller quantities.

ROS
Const
Constitutional: No anorexia, fatigue, fever(s), weight change or sleep problems
Eyes
Eyes: No change in vision
ENT
ENT: No abnormal hearing, difficulty swallowing, mouth lesions, tongue swelling or throat swelling
Resp
Respiratory: No cough or shortness of breath
Cardio
Cardiology: No chest pain at rest, chest pain with exertion, shortness of breath or dyspnea on exertion
Gastro
GI: No difficulty swallowing

Genitourinary-Female: No difficulty urinating or burning urination
Musc
Musculoskeletal: No joint pain, joint swelling, muscle weakness or decreased muscle mass
Skin
Skin: No hair loss in leg, yellowing of the eye, itchy eyes, rash, skin ulcer or skin swelling
Neuro
Neurology: No abnormal hearing, abnormal movements, confusion, unsteady gait/balance or memory loss
Psych
Psychiatric: No anxiety, No confusion and No memory loss
Endo
Endocrine: No fatigue or weight change
Aller/Imm
Allergy/Immunologic: No itchy eyes, throat swelling or tongue swelling
Hema/Lymp
Hematologic/Lymphatic: No easy bleeding, easy bruising or enlarged lymph nodes

Exam
Const
General: cooperative and comfortable
Nutritional Appearance: average body habitus and well nourished
HENMT
Head: normal to inspection
Ears: hearing grossly normal bilaterally
Nose: external nose normal
Face and sinus: normal facial exam
Mouth: oral mucosae normal
Throat: posterior oropharynx normal
Eyes
General: appearance normal, both eyes and all related structures
Neck
Neck: normal visual inspection
Chest
Chest palpation & inspection: normal inspection of the chest and normal palpation of entire chest wall
Resp
Effort & Inspection: normal respiratory effort
Auscultation: Bilateral: Clear to Auscultation
Cardio
Palpation: normal PMI
Rate: regular rate
Rhythm: regular rhythm
GI
Inspection: normal to inspection
Auscultation: normal bowel sounds
Percussion: normal to percussion
Palpation: no hepatosplenomegaly
Skin
General: no rashes or lesions noted
Neuro
General: patient alert
Extrem
General: normal to inspection
Psych
Affect: normal affect

Quality Reporting
Tobacco Screening ()
Smoking Status: Former smoker

Assessment and Plan
Assessment and Plan
(1) Intestinal obstruction: 
      Status: Chronic
      Qualifiers:
        Intestinal obstruction type: unspecified  Intestinal obstruction extent: unspecified extent  Qualified Code(s): K56.609 - Unspecified intestinal obstruction, unspecified as to partial versus complete obstruction
      Plan: 
Differential diagnosis for her multiple small bowel obstructions are radiation-induced enteritis, adhesions, intermittent volvulus or intussusception of the bowel.  She will undergo an MRI enterography and also biochemical testing for any associated 
vasculitis or inflammatory bowel disease. She will undergo patency capsule study.
(2) History of malignant neoplasm of colon: 
      Status: Chronic
      Plan: 
She had a surveillance colonoscopy because of her history of rectal cancer about 3 years ago.  She will need a repeat colonoscopy in the future for surveillance purposes.

## 2024-02-21 NOTE — OP.COLON_ITS
Patient Name: Arpita Mcghee 
Procedure Date: 2/21/2024 8:22 AM 
MRN: [id number] 
YOB: 1955 
Account Number: [id number] 
Age: 68 
Procedure:                    Colonoscopy 
Indications:                  High risk colon cancer surveillance: Personal  
                              history of colon cancer 
Providers:                    Peña Acharya DO 
Referring MD:                 Melchor Vargas 
Medicines:                    Monitored Anesthesia Care 
Patient Profile:              This is a 68 year old female. Refer to note in  
                              patient chart for documentation of history and  
                              physical. Patient has symptoms of chronic  
                              abdominal cramping and chronic epigastric  
                              abdominal pain. Last Colonoscopy: 3 years ago. 
Complications:                No immediate complications. 
Procedure:                    Pre-Anesthesia Assessment: 
                              - Prior to the procedure, a History and  
                              Physical was performed, and patient medications  
                              and allergies were reviewed. The patient is  
                              competent. The risks and benefits of the  
                              procedure and the sedation options and risks  
                              were discussed with the patient. All questions  
                              were answered and informed consent was  
                              obtained. Patient identification and proposed  
                              procedure were verified by the physician in the  
                              pre-procedure area. Mental Status Examination:  
                              alert and oriented. Airway Examination: normal  
                              oropharyngeal airway and neck mobility.  
                              Respiratory Examination: clear to auscultation.  
                              CV Examination: normal. Prophylactic  
                              Antibiotics: The patient does not require  
                              prophylactic antibiotics. Prior Anticoagulants:  
                              The patient has taken no anticoagulant or  
                              antiplatelet agents. ASA Grade Assessment: II -  
                              A patient with mild systemic disease. After  
                              reviewing the risks and benefits, the patient  
                              was deemed in satisfactory condition to undergo  
                              the procedure. The anesthesia plan was to use  
                              monitored anesthesia care (MAC). Immediately  
                              prior to administration of medications, the  
                              patient was re-assessed for adequacy to receive  
                              sedatives. The heart rate, respiratory rate,  
                              oxygen saturations, blood pressure, adequacy of  
                              pulmonary ventilation, and response to care  
                              were monitored throughout the procedure. The  
                              physical status of the patient was re-assessed  
                              after the procedure. 
                              After I obtained informed consent, the scope  
                              was passed under direct vision. Throughout the  
                              procedure, the patient's blood pressure, pulse,  
                              and oxygen saturations were monitored  
                              continuously. The Colonoscope was introduced  
                              through the anus and advanced to the cecum,  
                              identified by appendiceal orifice and ileocecal  
                              valve. The colonoscopy was performed without  
                              difficulty. The patient tolerated the procedure  
                              well. The quality of the bowel preparation was  
                              adequate. The ileocecal valve, appendiceal  
                              orifice, and rectum were photographed. 
Scope In: 8:25:03 AM 
Scope Withdrawal Time 0 hours 8 minutes 22 seconds  
Scope Out: 8:39:25 AM 
Total Procedure Duration Time 0 hours 14 minutes 22 seconds  
Findings: 
     The perianal and digital rectal examinations were normal. 
     There was evidence of a prior end-to-side colo-colonic anastomosis in  
     the rectum. This was patent and was characterized by healthy appearing  
     mucosa. The anastomosis was traversed. Biopsies were taken with a cold  
     forceps for histology. Verification of patient identification for the  
     specimen was done. Estimated blood loss was minimal. 
     An 8 mm polyp was found in the transverse colon. The polyp was sessile.  
     The polyp was removed with a hot snare. Resection and retrieval were  
     complete. Verification of patient identification for the specimen was  
     done. Estimated blood loss was minimal. 
     The exam was otherwise without abnormality. 
Impression:                   - Patent end-to-side colo-colonic anastomosis,  
                              characterized by healthy appearing mucosa.  
                              Biopsied. 
                              - One 8 mm polyp in the transverse colon,  
                              removed with a hot snare. Resected and  
                              retrieved. 
                              - The examination was otherwise normal. 
Recommendation:               - Discharge patient to home. 
                              - Resume previous diet. 
                              - Continue present medications. 
                              - Await pathology results. 
                              - Repeat colonoscopy for surveillance. 
Procedure Code(s):            --- Professional --- 
                              80351, Colonoscopy, flexible; with removal of  
                              tumor(s), polyp(s), or other lesion(s) by snare  
                              technique 
                              05810, 59, Colonoscopy, flexible; with biopsy,  
                              single or multiple 
CPT copyright 2022 [employer]. All rights reserved. 
The codes documented in this report are preliminary and upon  review may  
be revised to meet current compliance requirements. 
Peña Acharya DO 
2/21/2024 8:49:59 AM 
This report has been signed electronically. 
Number of Addenda: 0 
Note Initiated On: 2/21/2024 8:22 AM

## 2024-03-25 ENCOUNTER — HOSPITAL ENCOUNTER (OUTPATIENT)
Dept: HOSPITAL 100 - CT | Age: 69
Discharge: HOME | End: 2024-03-25
Payer: MEDICARE

## 2024-03-25 DIAGNOSIS — K56.609: Primary | ICD-10-CM

## 2024-03-25 LAB
CREATININE FINGERSTICK: < 1 MG/DL (ref 0.55–1.02)
EGFR FINGERSTICK: > 60 ML/MIN (ref 60–?)

## 2024-03-25 PROCEDURE — 74177 CT ABD & PELVIS W/CONTRAST: CPT

## 2024-03-25 NOTE — CT_ITS
REPORT-ID:[id number] 
 
STUDY:  CT ABDOMEN AND PELVIS WITH CONTRAST 
 
REASON FOR EXAM:   Female, 68 years old.  2.10.24 CT OSH twisted stomach 
 
RADIATION DOSAGE (If Supplied By Facility):  CTDIvol = ( 10.03 ) mGy, DLP = 
( 314.74 ) mGycm 
 
TECHNIQUE:   Transaxial images were obtained from the dome of the diaphragm 
to the symphysis pubis without oral contrast. IV 100mL Isovue-300 was 
administered.  Sagittal and coronal images were reconstructed. 
 
Individualized dose optimization techniques were used for this CT. 
 
COMPARISON:   None. 
___________________________________ 
 
FINDINGS: 
The visualized lung bases are unremarkable.  The visualized portions of the 
heart are within normal limits. 
 
Normal liver.  Normal gallbladder and extrahepatic biliary system.  Normal 
spleen.  Normal pancreas. 
 
Normal bilateral adrenal glands. 
 
Normal right kidney.  Normal left kidney. 
 
Normal visualized stomach.  Normal small intestine.  Large amount of fecal 
material is seen in the colon. Surgical anastomosis is seen at the level of 
the rectum.`  There is non-visualization of the appendix. 
 
Normal abdominal aorta.  Normal inferior vena cava.  Normal 
retroperitoneum. 
 
Normal urinary bladder.  There is absence of the uterus consistent with a 
prior hysterectomy. 
 
Normal abdominal wall.  There are mild degenerative changes of the 
visualized lumbar spine. Levoscoliosis. 
___________________________________ 
 
ORDER #: [id number] CT/Abdomen/Pelvis WITH Contrast  
IMPRESSION:  
Large amount of fecal material is seen in the colon.  
 
  
Surgical anastomosis is seen at the level of the rectum.  
 
  
Electronically Signed:  
Keanu Browning MD  
2024/03/26 at 8:53 EDT  
Reading Location ID and State: [address] / [place]  
Tel [phone], Service support  [phone], Fax [phone]

## 2024-03-25 NOTE — XMS RPT_ITS
Comprehensive CCD (C-CDA v2.1)  
  
                           Created on: 2024  
  
  
AR PEACOCK JEFF  
External Reference #: [id number]  
: 1955  
Sex: Female  
  
Demographics  
  
  
                                        Address             [address]  
[place]  [address]  
   
                                        Home Phone          [phone]  
   
                                        Preferred Language  en  
   
                                        Marital Status        
   
                                        [employer] Affiliation Unknown  
   
                                        Race                White  
   
                                        Ethnic Group        Not  or Lati  
no  
  
  
Author  
  
  
                                        Name                Unknown  
   
                                        Address             [address]  
[address]  
[place]  [address]  
   
                                        Phone               [phone]  
   
                                        Organization        [facility]  
  
  
Care Team Providers  
  
  
                                Care Team Member Name Role            Phone  
   
                                DR RIOS BURNS DO Primary Care Physician [phone]  
   
                                Anamika Cordero PT Unavailable     Unavailable  
   
                                DR RIOS BURNS DO Primary Care    Unavailtalia TATE DO, GERONIMO Consulting      Unavailable  
   
                                YAKOV ORNELAS, EDUARDO Attending       Unavailable  
  
  
  
Allergies  
  
  
                                                    Allergy   
Classification                          Reported   
Allergen(s)               Allergy Type              Date of   
Onset                     Reaction(s)               Facility  
   
                                                      
(1 source)                              Erythromycin;   
Translations:   
[erythromycin]      Drug Allergy                            Nausea and   
vomiting                                [facility]  
Work Phone:   
[phone]  
  
  
  
Medications  
Current Medications  
  
  
  
                      Medication Drug Class(es) Dates      Sig (Normalized) Sig   
(Original)  
   
                                                    biotin 1 mg oral   
tablet  
(1 source)                                          Start:   
2014                                          biotin 1000 mcg   
oral tablet Dose :   
1,000 mcg = 1   
tab(s), Oral,   
qDay, 0 Refill(s)   
Start Date:   
14 Status:   
Ordered  
   
                                                    escitalopram 10 mg   
oral tablet  
(1 source)                              Serotonin Reuptake   
Inhibitor                               Start:   
2021                              take 1 tablet by   
mouth once daily                        escitalopram 10 mg   
oral tablet See   
Instructions, TAKE   
1 TABLET BY MOUTH   
EVERY DAY, # 90   
tab(s), 3   
Refill(s),   
Pharmacy:   
[facility]/pharmacy   
#[id number], 167.6, cm,   
21 9:20:00   
EDT, Height, kg,   
21 9:20:00   
EDT, Dosing Weight   
Start Date:   
11/3/21 Status:   
Ordered  
   
                                                    Multivitamin   
preparation  
(1 source)                                          Start:   
2014                              take 1 tablet by   
mouth once daily                        Multivitamin Dose   
= 1 tab(s), Oral,   
qDay, 0 Refill(s)   
Start Date:   
5/29/14 Status:   
Ordered  
   
                                                    naproxen 250 mg   
oral tablet  
(1 source)                              Nonsteroidal   
Anti-inflammatory   
Drug                                    Start:   
2021                                          naproxen 250 mg   
oral tablet Dose :   
250 mg = 1 tab(s),   
Oral, BID, PRN as   
needed for pain, #   
20 tab(s), 0   
Refill(s) Start   
Date: 21   
Status: Ordered  
   
                                                    SUMAtriptan 50 mg   
oral tablet  
(1 source)                              Serotonin-1b and   
Serotonin-1d   
Receptor Agonist                        Start:   
2021                              take 1 tablet by   
mouth every two   
hours, then take 4   
tablets by mouth   
every twenty-four   
hours                                   SUMAtriptan 50 mg   
oral tablet See   
Instructions, TAKE   
1 TAB AT ONSET OF   
HEADACHE MAY REPAT   
IN 2 HRS MAX 4   
TABS IN 24 HRS, #   
9 tab(s), 5   
Refill(s),   
Pharmacy:   
[facility]/pharmacy   
#[id number], 168, cm,   
21 14:18:00   
EST, Height, kg,   
21 14:18:00   
EST, Dosing Weight   
Start Date:   
12/3/21 Status:   
Ordered  
  
  
  
Completed/Discontinued Medications  
  
  
  
                      Medication Drug Class(es) Dates      Sig (Normalized) Sig   
(Original)  
   
                                                    acetaminophen 325 mg /   
oxyCODONE hydrochloride   
5 mg oral tablet  
(3 sources)               Opioid Agonist            Start:   
2021  
End:   
2021                              take 1 tablet by   
mouth three times   
daily as needed   
for pain                                acetaminophen-oxyC  
ODONE 325 mg-5 mg   
oral tablet Dose =   
1 tab(s), Oral,   
TID, PRN for pain,   
fill 22, # 84   
tab(s), 0   
Refill(s),   
Pharmacy:   
[facility]/pharmacy   
#[id number],   
Fibromyalgia,   
secondary, 168,   
cm, 21   
14:18:00 EST,   
Height, 58.1, kg,   
21 14:18:00   
EST, Dosing Weight   
Start Date:   
12/3/21 Stop Date:   
21 Status:   
Ordered  
   
                                                    Amphetamine /   
Dextroamphetamine  
(3 sources)                             Central Nervous   
System   
Stimulant                               Start:   
2021  
End:   
2021                                          amphetamine-dextro  
amphetamine 15 mg   
oral capsule,   
extended release   
Dose : 15 mg = 1   
cap(s), Oral, qAM,   
fill 22, # 28   
cap(s), 0   
Refill(s),   
Pharmacy:   
[facility]/pharmacy   
#[id number], ADHD,   
predominantly   
inattentive type,   
168, cm, 21   
14:18:00 EST,   
Height, 58.1, kg,   
21 14:18:00   
EST, Dosing Weight   
Start Date:   
12/3/21 Stop Date:   
21 Status:   
Ordered  
  
  
  
                                          
   
                                          
   
                                          
   
                                          
   
                                          
   
                                          
  
  
  
Problems  
  
  
                      Problem Classification Problem    Date       Documented Da  
te Episodic/Chronic  
   
                                                    Abdominal pain  
(2 sources)                             Abdominal pain;   
Translations:   
[Periumbilical pain]                     11-          Episodic  
   
                                                    Anxiety disorders  
(1 source)                              Generalized anxiety   
disorder                                2019          Chronic  
   
                                                    Attention-deficit,   
conduct, and disruptive   
behavior disorders  
(1 source)                              Attention deficit   
hyperactivity disorder,   
predominantly   
inattentive type                        2019          Chronic  
   
                                                    Cancer of colon  
(1 source)                              History of malignant   
neoplasm of colon                       11-          Episodic  
   
                                                    Headache; including   
migraine  
(1 source)      Migraine without aura                 2021      Chronic  
   
                                                    Intestinal obstruction   
without hernia  
(1 source)      Small bowel obstruction                 2019      Episodic  
  
  
  
                                          
   
                                          
   
                                          
  
  
  
Results  
  
  
                      Test Name  Value      Interpretation Reference Range Facil  
ity  
  
  
  
                                          
   
                                          
   
                                          
   
                                          
   
                                          
   
                                          
   
                                          
   
                                          
   
                                          
   
                                          
   
                                          
   
                                          
   
                                          
   
                                          
   
                                          
   
                                          
   
                                          
   
                                          
   
                                          
   
                                          
   
                                          
   
                                          
   
                                          
   
                                          
   
                                          
   
                                          
   
                                          
   
                                          
   
                                          
   
                                          
   
                                          
   
                                          
   
                                          
   
                                          
   
                                          
   
                                          
   
                                          
   
                                          
   
                                          
   
                                          
   
                                          
   
                                          
   
                                          
   
                                          
   
                                          
   
                                          
   
                                          
   
                                          
   
                                          
   
                                          
   
                                          
   
                                          
   
                                          
   
                                          
   
                                          
   
                                          
   
                                          
   
                                          
   
                                          
   
                                          
   
                                          
   
                                          
   
                                          
   
                                          
   
                                          
   
                                          
   
                                          
   
                                          
   
                                          
   
                                          
   
                                          
   
                                          
   
                                          
   
                                          
   
                                          
   
                                          
   
                                          
   
                                          
   
                                          
   
                                          
   
                                          
   
                                          
   
                                          
   
                                          
   
                                          
   
                                          
   
                                          
   
                                          
   
                                          
   
                                          
   
                                          
   
                                          
   
                                          
   
                                          
   
                                          
   
                                          
   
                                          
   
                                          
   
                                          
   
                                          
   
                                          
   
                                          
   
                                          
   
                                          
   
                                          
   
                                          
   
                                          
   
                                          
   
                                          
   
                                          
   
                                          
   
                                          
   
                                          
   
                                          
   
                                          
   
                                          
   
                                          
   
                                          
   
                                          
   
                                          
   
                                          
   
                                          
   
                                          
   
                                          
   
                                          
   
                                          
   
                                          
   
                                          
   
                                          
   
                                          
   
                                          
   
                                          
   
                                          
   
                                          
   
                                          
   
                                          
   
                                          
   
                                          
   
                                          
   
                                          
   
                                          
   
                                          
   
                                          
   
                                          
   
                                          
   
                                          
  
  
  
Vital Signs  
  
  
                      Date Time  Vital Sign Value      Performing Clinician Faci  
lity  
   
                                                    2022   
06:      Body temperature 96.98 [degF]    SANTA LEVINE MD  [facility]  
Work Phone:   
[phone]  
   
                                                    2022   
06:                              Diastolic blood   
pressure            87 mm[Hg]           SANTA LEVINE MD      [facility]  
Work Phone:   
[phone]  
   
                                                    2022   
06:      Heart rate      53 /min         SANTA LEVINE MD  [facility]  
Work Phone:   
[phone]  
   
                                                    2022   
06:      Mean blood pressure 120 mm[Hg]      SANTA LEVINE MD  [facility]  
Work Phone:   
[phone]  
   
                                                    2022   
06:      Respiratory rate 18 /min         SANTA LEVINE MD  [facility]  
Work Phone:   
[phone]  
   
                                                    2022   
06:                              Systolic blood   
pressure            185 mm[Hg]          SANTA LEVINE MD      [facility]  
Work Phone:   
[phone]  
  
  
  
Encounters  
  
  
                          Encounter Date Encounter Type Care Provider Facility  
   
                                                    Start: 02-  
End: 02-                         Emergency department   
patient visit             DR RIOS BURNS DO      Facility:[facility]  
   
                                                    Start: 2022  
End: 2022                         Emergency department   
patient visit             SANTA LEVINE MD          [facility]  
Work Phone:   
[phone]  
  
  
  
Procedures  
  
  
                          Date         Procedure    Procedure Detail Performing   
Clinician  
   
                          Start: 1981 Oophorectomy              SANTA DAY MD  
  
  
  
                                          
   
                                          
   
                                          
   
                                          
   
                                          
  
  
  
Immunizations  
  
  
                      Immunization Date Immunization Notes      Care Provider Fa  
cility  
   
                                        2022          SARS-CoV-2 mRNA   
(tozinameran) vaccine                     SANTA LEVINE MD      [facility]  
Work Phone:   
[phone]  
   
                                        10-          influenza, injectabl  
e,   
quadrivalent,   
preservative free;   
Translations: [Fluarix PF   
Quadrivalent [id number]]                     SANTA LEVINE MD      [facility]  
Work Phone:   
[phone]  
   
                                        2021          SARS-CoV-2 mRNA   
(tozinameran) vaccine                     SANTA LEVINE MD      [facility]  
Work Phone:   
[phone]  
   
                                        2021          SARS-CoV-2 mRNA   
(tozinameran) vaccine                     SANTA LEVINE MD      [facility]  
Work Phone:   
[phone]  
   
                                        10-          influenza virus vacc  
ine,   
unspecified formulation                     SANTA LEVINE MD      [facility]  
Work Phone:   
[phone]  
   
                                        2017          influenza virus vacc  
ine,   
unspecified formulation                     SANTA LEVINE MD      [facility]  
Work Phone:   
[phone]  
   
                                        2017          tetanus toxoid, redu  
man   
diphtheria toxoid, and   
acellular pertussis   
vaccine, adsorbed                       SANTA LEVINE MD      [facility]  
Work Phone:   
[phone]  
   
                                        2016          influenza virus vacc  
ine,   
unspecified formulation                     SANTA LEVINE MD      [facility]  
Work Phone:   
[phone]  
   
                                        2015          influenza virus vacc  
ine,   
unspecified formulation                     SANTA LEVINE MD      [facility]  
Work Phone:   
[phone]  
   
                                        2014          influenza virus vacc  
ine,   
unspecified formulation                     SANTA LEVINE MD      [facility]  
Work Phone:   
[phone]  
  
  
  
Payers  
  
  
                          Date         Payer Category Payer        Policy ID  
   
                          02-   Medicare                  [id number]  
   
                          02-   Private Health Insurance              CLI  
[id number]  
   
                          1955   Unknown                   [id number]  
[id number]  
  
  
  
Social History  
  
  
                          Date         Type         Detail       Facility  
   
                          Start: 2019              Ex-smoker (finding) [facility]  
Work Phone: [phone]  
  
  
  
                                          
   
                                          
  
  
  
Clinical Note 2024  
  
  
                                Note Date & Type Note            Facility  
   
                                        2024 Note     .  
MICRO - Microbiology  
PROCEDURE: Urine Culture [*1]   
ACCESSION: [id number]  
SOURCE: Urine, Clean Catch BODY SITE:  
COLLECTED DATE/TIME: 2/10/2024 10:52   
EST RECEIVED DATE/TIME: 2/10/2024   
16:38 EST  
START DATE/TIME: 2/10/2024 16:38 EST   
FREE TEXT SOURCE:  
***FINAL REPORTS***  
Final Report []  
Verified Date/Time/Personnel:   
2024 14:02 EST  
>100,000 cfu/ml Mixed growth   
consistent with normal urogenital   
mynor.  
Performing Locations  
*1: This test was performed at:  
[facility], [address] [place] [address], [address] [place] [address] ([place])  
  
  
  
Hospital Discharge instructions 2022  
  
  
                                Note Date & Type Note            Facility  
   
                                                    2022 Hospital Discharg  
e   
instructions                              
  
  
Patient Education  
  
  
2022 06:41:46  
  
  
Small Bowel Obstruction  
  
  
Small Bowel Obstruction  
  
Small bowel obstruction can   
lead to tissue damage and even   
tissue death.  
A small bowel obstruction   
occurs when part or all of the   
small intestine (bowel) is   
blocked. As a result,   
digestive contents can t move   
through the bowel properly and   
out of the body. Treatment is   
needed right away to remove   
the blockage. This can ease   
painful symptoms. It can also   
prevent serious problems, such   
as tissue death or bursting   
(rupture) of the small bowel.   
Without treatment, a small   
bowel obstruction can be   
fatal.  
Causes of small bowel   
obstruction  
A small bowel obstruction can   
be caused by:  
Scar tissue (adhesions). These   
may form after belly   
(abdominal) surgery or an   
infection.  
Hernia. A hernia is when an   
organ pushes through a weak   
spot or tear in the abdomen   
wall. Part of the small bowel   
can push out and be seen as a   
bulge under the belly. Hernias   
can also occur internally.  
Certain health problems. These   
include when part of the bowel   
slides inside another part   
(intussusception). Other   
causes include irritable bowel   
disease such as Crohn s   
disease, and inflammation and   
sores in the intestine   
(ulcerative colitis).  
Abnormal tissue growths   
(tumors). These can form on   
the inside or outside of the   
small bowel. They are usually   
due to cancer.  
Symptoms of small bowel   
obstruction  
Common symptoms include:  
Belly cramping and pain  
Belly swelling and bloating  
Upset stomach (nausea) and   
vomiting  
Can't pass gas  
Can't pass stool   
(constipation)  
Diarrhea  
Diagnosing small bowel   
obstruction  
Your provider will ask about   
your symptoms and health   
history. You ll also have a   
physical exam. Tests may also   
be done to confirm the   
problem. These can include:  
Imaging tests. These provide   
pictures of the small bowel.   
Common tests include X-rays   
and a CT scan.  
Blood tests. These check for   
infection and other problems,   
such as excess fluid loss   
(dehydration).  
Upper GI (gastrointestinal)   
series with a small bowel   
follow-through. This test   
takes X-rays of the upper   
digestive tract from the mouth   
through the small bowel. An   
X-ray dye (contrast fluid) is   
used. The dye coats the inside   
of your upper digestive tract   
so it will show up clearly on   
X-rays.  
Treating small bowel   
obstruction  
Treatment takes place in a   
hospital. As part of your   
care, the following may be   
done:  
No food or drink is given by   
mouth. This allows your bowels   
to rest.  
An IV (intravenous) line is   
placed in a vein in your arm   
or hand. The IV line is used   
to give fluids. It may also be   
used to give medicines. These   
may be needed to ease pain,   
nausea, and other symptoms.   
They may also be needed to   
treat or prevent infections.  
A soft, thin, flexible tube   
(nasogastric tube) is inserted   
through your nose and into   
your stomach. The tube is used   
to remove extra gas and fluid   
in your stomach and bowels.   
This helps to ease symptoms   
such as pain and swelling.  
In severe cases, surgery is   
done. This may be needed if   
the small bowel is almost or   
totally blocked, or there is a   
hole in the bowel (bowel   
perforation). During surgery,   
the blockage is removed. Parts   
of the bowel may also be   
removed if there is tissue   
death. Other repair may be   
done as well, depending on   
what caused the blockage. Your   
healthcare provider will give   
you more information about   
surgery, if needed.  
You ll be watched closely in   
the hospital until your   
symptoms improve. Your   
provider will tell you when   
you can go home.  
Long-term concerns  
After treatment, most people   
recover with no lasting   
effects. If a long part of the   
bowel is removed, there is a   
greater chance for lifelong   
digestive problems. Bowel   
movements may become   
irregular. Work with your   
provider to learn the best   
ways to manage any symptoms   
you may have, and to protect   
your health.  
When to call your healthcare   
provider  
Call your provider right away   
if you have any of the   
following:  
Severe pain (call 911)  
Belly swelling or cramping   
that won t go away  
Can t pass stool or gas  
Nausea or vomiting (especially   
if the vomit looks or smells   
like stool)  
  
[id number] [employer]. [address] [place] [address].   
All rights reserved. This   
information is not intended as   
a substitute for professional   
medical care. Always follow   
your healthcare professional's   
instructions.  
  
  
  
Follow Up Care  
  
  
2022 05:59:07  
  
  
With:RIOS BURNS DO  
Address:  
[id number]  
  
When:2-4 days                           [facility]  
Work Phone: [phone]  
  
  
  
Evaluation + Plan note  
                               LaboratoryRadiology  
  
                                Note Date & Type Note            Facility  
   
                                        Evaluation + Plan note   
  
  
Future Appointments  
  
  
Appointment Date:03/10/2022   
01:45:00 PM  
Scheduled Provider:RIOS BURNS DO  
Location:[facility]  
Appointment Type:PC OV Controlled   
Medication  
  
  
  
Future Scheduled TestsComplete   
Metabolic Panel 10/7/21MA Mammo   
Screening Bilateral w/ Tommie   
21  
  
                                        [facility]  
Work Phone: [phone]  
  
  
  
Hospital course Narrative  
  
  
                                Note Date & Type Note            Facility  
   
                                        Hospital course Narrative   
  
  
No data available for this   
section                                 [facility]  
Work Phone: [phone]  
  
  
  
Summary Purpose  
  
  
                                                      
  
  
  
Family History  
No Family History Records FoundNo Family History Records FoundNo Family History 
Records Found  
  
Advance Directives  
No Advanced Directives Records FoundNo Advanced Directives Records FoundNo 
Advanced Directives Records Found  
  
Additional Source Comments  
  
  
  
                                                    INFORMATION SOURCE (unrecogn  
ized section and content)  
   
                                          
  
  
  
                                          
   
                                          
  
  
  
                                DATE CREATED    AUTHOR          AUTHOR'S ORGANLUCILA  
ATION  
   
                                2019                        
  
  
  
                                DATE CREATED    AUTHOR          AUTHOR'S ORGANLUCILA  
ATION  
   
                                03/15/2024                      [facility]  
[place]  
  
  
FOR RECORDS PERTAINING TO PATIENTS WHO ARE OR HAVE BEEN ENROLLED IN A CHEMICAL 
DEPENDENCY/SUBSTANCEABUSE PROGRAM, SOME INFORMATION MAY BE OMITTED. This 
clinical summary was aggregated from multiple sources. Caution should be 
exercised in using it in the provision of clinical care. This summary normalizes
 information from multiple sources, and as a consequence, information in this 
document may materially change the coding, format and clinical context of 
patient data. In addition, data may be omitted in some cases. CLINICAL DECISIONS
 SHOULD BE BASED ON THE PRIMARY CLINICAL RECORDS. [employer] [facility]. provides
 no warranty or guarantee of the accuracy or completeness of information in this
 document.